# Patient Record
Sex: MALE | Race: WHITE
[De-identification: names, ages, dates, MRNs, and addresses within clinical notes are randomized per-mention and may not be internally consistent; named-entity substitution may affect disease eponyms.]

---

## 2017-12-13 ENCOUNTER — HOSPITAL ENCOUNTER (EMERGENCY)
Dept: HOSPITAL 94 - ER | Age: 32
Discharge: HOME | End: 2017-12-13
Payer: MEDICARE

## 2017-12-13 VITALS — SYSTOLIC BLOOD PRESSURE: 121 MMHG | DIASTOLIC BLOOD PRESSURE: 65 MMHG

## 2017-12-13 VITALS — WEIGHT: 176.37 LBS | HEIGHT: 66 IN | BODY MASS INDEX: 28.34 KG/M2
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
## 2017-12-13 DIAGNOSIS — G89.29: ICD-10-CM
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
## 2017-12-13 DIAGNOSIS — S80.02XA: Primary | ICD-10-CM
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
## 2017-12-13 DIAGNOSIS — M25.562: ICD-10-CM

## 2017-12-13 DIAGNOSIS — Z88.1: ICD-10-CM

## 2017-12-13 PROCEDURE — 29505 APPLICATION LONG LEG SPLINT: CPT

## 2017-12-13 PROCEDURE — 99284 EMERGENCY DEPT VISIT MOD MDM: CPT

## 2017-12-13 PROCEDURE — 73564 X-RAY EXAM KNEE 4 OR MORE: CPT

## 2018-04-05 ENCOUNTER — HOSPITAL ENCOUNTER (EMERGENCY)
Dept: HOSPITAL 94 - ER | Age: 33
LOS: 3 days | Discharge: HOME | End: 2018-04-08
Payer: MEDICARE

## 2018-04-05 VITALS — WEIGHT: 209.44 LBS | BODY MASS INDEX: 24.23 KG/M2 | HEIGHT: 78 IN

## 2018-04-05 DIAGNOSIS — Z56.0: ICD-10-CM

## 2018-04-05 DIAGNOSIS — Z88.1: ICD-10-CM

## 2018-04-05 DIAGNOSIS — F31.9: Primary | ICD-10-CM

## 2018-04-05 DIAGNOSIS — F41.9: ICD-10-CM

## 2018-04-05 DIAGNOSIS — G89.29: ICD-10-CM

## 2018-04-05 DIAGNOSIS — F23: ICD-10-CM

## 2018-04-05 DIAGNOSIS — Z79.899: ICD-10-CM

## 2018-04-05 DIAGNOSIS — N19: ICD-10-CM

## 2018-04-05 LAB
ALBUMIN SERPL BCP-MCNC: 4.4 G/DL (ref 3.4–5)
ALBUMIN/GLOB SERPL: 1.3 {RATIO} (ref 1.1–1.5)
ALP SERPL-CCNC: 68 IU/L (ref 46–116)
ALT SERPL W P-5'-P-CCNC: 34 U/L (ref 12–78)
ANION GAP SERPL CALCULATED.3IONS-SCNC: 17 MMOL/L (ref 8–16)
AST SERPL W P-5'-P-CCNC: 25 U/L (ref 10–37)
BASOPHILS # BLD AUTO: 0.1 X10'3 (ref 0–0.2)
BASOPHILS NFR BLD AUTO: 0.6 % (ref 0–1)
BILIRUB SERPL-MCNC: 0.6 MG/DL (ref 0.1–1)
BUN SERPL-MCNC: 23 MG/DL (ref 7–18)
BUN/CREAT SERPL: 16.7 (ref 5.4–32)
CALCIUM SERPL-MCNC: 9.5 MG/DL (ref 8.5–10.1)
CHLORIDE SERPL-SCNC: 106 MMOL/L (ref 99–107)
CLARITY UR: CLEAR
CO2 SERPL-SCNC: 23 MMOL/L (ref 24–32)
COLOR UR: YELLOW
CREAT SERPL-MCNC: 1.38 MG/DL (ref 0.6–1.1)
EOSINOPHIL # BLD AUTO: 0.1 X10'3 (ref 0–0.9)
EOSINOPHIL NFR BLD AUTO: 1.6 % (ref 0–6)
ERYTHROCYTE [DISTWIDTH] IN BLOOD BY AUTOMATED COUNT: 12.7 % (ref 11.5–14.5)
ETHANOL SERPL-MCNC: < 0.01 GM/DL (ref 0–0.01)
GFR SERPL CREATININE-BSD FRML MDRD: 59 ML/MIN
GLUCOSE SERPL-MCNC: 104 MG/DL (ref 70–104)
GLUCOSE UR STRIP-MCNC: NEGATIVE MG/DL
HCT VFR BLD AUTO: 49.5 % (ref 42–52)
HGB BLD-MCNC: 17 G/DL (ref 14–17.9)
HGB UR QL STRIP: (no result)
KETONES UR STRIP-MCNC: (no result) MG/DL
LEUKOCYTE ESTERASE UR QL STRIP: NEGATIVE
LYMPHOCYTES # BLD AUTO: 2.4 X10'3 (ref 1.1–4.8)
LYMPHOCYTES NFR BLD AUTO: 26.5 % (ref 21–51)
MCH RBC QN AUTO: 30.8 PG (ref 27–31)
MCHC RBC AUTO-ENTMCNC: 34.4 % (ref 33–36.5)
MCV RBC AUTO: 89.5 FL (ref 78–98)
MONOCYTES # BLD AUTO: 0.5 X10'3 (ref 0–0.9)
MONOCYTES NFR BLD AUTO: 5.6 % (ref 2–12)
NEUTROPHILS # BLD AUTO: 5.8 X10'3 (ref 1.8–7.7)
NEUTROPHILS NFR BLD AUTO: 65.7 % (ref 42–75)
NITRITE UR QL STRIP: NEGATIVE
PH UR STRIP: 7 [PH] (ref 4.8–8)
PLATELET # BLD AUTO: 408 X10'3 (ref 140–440)
PMV BLD AUTO: 6.1 FL (ref 7.4–10.4)
POTASSIUM SERPL-SCNC: 3.9 MMOL/L (ref 3.5–5.1)
PROT SERPL-MCNC: 7.8 G/DL (ref 6.4–8.2)
PROT UR QL STRIP: NEGATIVE MG/DL
RBC # BLD AUTO: 5.53 X10'6 (ref 4.7–6.1)
SODIUM SERPL-SCNC: 146 MMOL/L (ref 135–145)
SP GR UR STRIP: 1.01 (ref 1–1.03)
URN COLLECT METHOD CLASS: (no result)
UROBILINOGEN UR STRIP-MCNC: 0.2 E.U/DL (ref 0.2–1)
WBC # BLD AUTO: 8.9 X10'3 (ref 4.5–11)

## 2018-04-05 PROCEDURE — 36415 COLL VENOUS BLD VENIPUNCTURE: CPT

## 2018-04-05 PROCEDURE — 81001 URINALYSIS AUTO W/SCOPE: CPT

## 2018-04-05 PROCEDURE — 80320 DRUG SCREEN QUANTALCOHOLS: CPT

## 2018-04-05 PROCEDURE — 96374 THER/PROPH/DIAG INJ IV PUSH: CPT

## 2018-04-05 PROCEDURE — 85025 COMPLETE CBC W/AUTO DIFF WBC: CPT

## 2018-04-05 PROCEDURE — 80053 COMPREHEN METABOLIC PANEL: CPT

## 2018-04-05 PROCEDURE — 80305 DRUG TEST PRSMV DIR OPT OBS: CPT

## 2018-04-05 PROCEDURE — 96372 THER/PROPH/DIAG INJ SC/IM: CPT

## 2018-04-05 PROCEDURE — 96375 TX/PRO/DX INJ NEW DRUG ADDON: CPT

## 2018-04-05 PROCEDURE — 99284 EMERGENCY DEPT VISIT MOD MDM: CPT

## 2018-04-05 PROCEDURE — 84443 ASSAY THYROID STIM HORMONE: CPT

## 2018-04-05 PROCEDURE — 96361 HYDRATE IV INFUSION ADD-ON: CPT

## 2018-04-05 RX ADMIN — HALOPERIDOL LACTATE ONE MG: 5 INJECTION, SOLUTION INTRAMUSCULAR at 20:20

## 2018-04-05 RX ADMIN — HALOPERIDOL LACTATE ONE MG: 5 INJECTION, SOLUTION INTRAMUSCULAR at 20:23

## 2018-04-05 SDOH — ECONOMIC STABILITY - INCOME SECURITY: UNEMPLOYMENT, UNSPECIFIED: Z56.0

## 2018-04-06 LAB
AMPHETAMINES UR QL SCN: POSITIVE
BACTERIA URNS QL MICRO: (no result) /HPF
BARBITURATES UR QL SCN: NEGATIVE
BENZODIAZ UR QL SCN: NEGATIVE
BZE UR QL SCN: NEGATIVE
CANNABINOIDS UR QL SCN: NEGATIVE
DEPRECATED SQUAMOUS URNS QL MICRO: (no result) /LPF
METHADONE UR QL SCN: NEGATIVE
MUCOUS THREADS URNS QL MICRO: (no result) /LPF
OPIATES UR QL SCN: NEGATIVE
PCP UR QL SCN: NEGATIVE
RBC #/AREA URNS HPF: (no result) /HPF (ref 0–2)
WBC #/AREA URNS HPF: (no result) /HPF (ref 0–4)

## 2018-04-06 RX ADMIN — DEXTROAMPHETAMINE SACCHARATE, AMPHETAMINE ASPARTATE, DEXTROAMPHETAMINE SULFATE AND AMPHETAMINE SULFATE SCH MG: 2.5; 2.5; 2.5; 2.5 TABLET ORAL at 12:30

## 2018-04-06 RX ADMIN — VARENICLINE TARTRATE SCH MG: 0.5 TABLET, FILM COATED ORAL at 20:12

## 2018-04-06 RX ADMIN — VARENICLINE TARTRATE SCH MG: 0.5 TABLET, FILM COATED ORAL at 08:00

## 2018-04-06 RX ADMIN — DEXTROAMPHETAMINE SACCHARATE, AMPHETAMINE ASPARTATE, DEXTROAMPHETAMINE SULFATE AND AMPHETAMINE SULFATE SCH MG: 2.5; 2.5; 2.5; 2.5 TABLET ORAL at 07:30

## 2018-04-07 RX ADMIN — OLANZAPINE SCH MG: 5 TABLET, ORALLY DISINTEGRATING ORAL at 08:00

## 2018-04-07 RX ADMIN — VARENICLINE TARTRATE SCH MG: 0.5 TABLET, FILM COATED ORAL at 20:00

## 2018-04-07 RX ADMIN — OLANZAPINE SCH MG: 5 TABLET, ORALLY DISINTEGRATING ORAL at 14:30

## 2018-04-07 RX ADMIN — VARENICLINE TARTRATE SCH MG: 0.5 TABLET, FILM COATED ORAL at 08:00

## 2018-04-07 RX ADMIN — VARENICLINE TARTRATE SCH MG: 0.5 TABLET, FILM COATED ORAL at 14:25

## 2018-04-08 ENCOUNTER — HOSPITAL ENCOUNTER (INPATIENT)
Dept: HOSPITAL 94 - ADULT MH | Age: 33
LOS: 10 days | Discharge: HOME | DRG: 885 | End: 2018-04-18
Attending: PSYCHIATRY & NEUROLOGY | Admitting: PSYCHIATRY & NEUROLOGY
Payer: MEDICARE

## 2018-04-08 VITALS — HEIGHT: 66 IN | BODY MASS INDEX: 29.55 KG/M2 | WEIGHT: 183.87 LBS

## 2018-04-08 VITALS — DIASTOLIC BLOOD PRESSURE: 46 MMHG | SYSTOLIC BLOOD PRESSURE: 90 MMHG

## 2018-04-08 VITALS — DIASTOLIC BLOOD PRESSURE: 66 MMHG | SYSTOLIC BLOOD PRESSURE: 104 MMHG

## 2018-04-08 VITALS — SYSTOLIC BLOOD PRESSURE: 132 MMHG | DIASTOLIC BLOOD PRESSURE: 84 MMHG

## 2018-04-08 DIAGNOSIS — N28.9: ICD-10-CM

## 2018-04-08 DIAGNOSIS — Z79.899: ICD-10-CM

## 2018-04-08 DIAGNOSIS — F31.9: ICD-10-CM

## 2018-04-08 DIAGNOSIS — F41.9: ICD-10-CM

## 2018-04-08 DIAGNOSIS — F20.9: ICD-10-CM

## 2018-04-08 DIAGNOSIS — M54.9: ICD-10-CM

## 2018-04-08 DIAGNOSIS — Z78.1: ICD-10-CM

## 2018-04-08 DIAGNOSIS — G89.29: ICD-10-CM

## 2018-04-08 DIAGNOSIS — F15.10: ICD-10-CM

## 2018-04-08 DIAGNOSIS — Z87.891: ICD-10-CM

## 2018-04-08 DIAGNOSIS — E87.0: ICD-10-CM

## 2018-04-08 DIAGNOSIS — F29: Primary | ICD-10-CM

## 2018-04-08 PROCEDURE — 99285 EMERGENCY DEPT VISIT HI MDM: CPT

## 2018-04-08 PROCEDURE — 36415 COLL VENOUS BLD VENIPUNCTURE: CPT

## 2018-04-08 PROCEDURE — 80061 LIPID PANEL: CPT

## 2018-04-08 PROCEDURE — 87070 CULTURE OTHR SPECIMN AEROBIC: CPT

## 2018-04-08 PROCEDURE — 83036 HEMOGLOBIN GLYCOSYLATED A1C: CPT

## 2018-04-08 PROCEDURE — 80053 COMPREHEN METABOLIC PANEL: CPT

## 2018-04-08 PROCEDURE — 71110 X-RAY EXAM RIBS BIL 3 VIEWS: CPT

## 2018-04-08 RX ADMIN — VARENICLINE TARTRATE SCH MG: 0.5 TABLET, FILM COATED ORAL at 19:21

## 2018-04-08 RX ADMIN — VARENICLINE TARTRATE SCH MG: 0.5 TABLET, FILM COATED ORAL at 09:51

## 2018-04-09 VITALS — SYSTOLIC BLOOD PRESSURE: 92 MMHG | DIASTOLIC BLOOD PRESSURE: 45 MMHG

## 2018-04-09 VITALS — SYSTOLIC BLOOD PRESSURE: 107 MMHG | DIASTOLIC BLOOD PRESSURE: 67 MMHG

## 2018-04-09 LAB
ALBUMIN SERPL BCP-MCNC: 3.4 G/DL (ref 3.4–5)
ALBUMIN/GLOB SERPL: 1.1 {RATIO} (ref 1.1–1.5)
ALP SERPL-CCNC: 60 IU/L (ref 46–116)
ALT SERPL W P-5'-P-CCNC: 37 U/L (ref 12–78)
ANION GAP SERPL CALCULATED.3IONS-SCNC: 10 MMOL/L (ref 8–16)
AST SERPL W P-5'-P-CCNC: 24 U/L (ref 10–37)
BILIRUB SERPL-MCNC: 0.3 MG/DL (ref 0.1–1)
BUN SERPL-MCNC: 17 MG/DL (ref 7–18)
BUN/CREAT SERPL: 16.8 (ref 5.4–32)
CALCIUM SERPL-MCNC: 8.7 MG/DL (ref 8.5–10.1)
CHLORIDE SERPL-SCNC: 107 MMOL/L (ref 99–107)
CHOLEST SERPL-MCNC: 212 MG/DL (ref 0–200)
CHOLEST/HDLC SERPL: 5.7 {RATIO} (ref 0–4.99)
CO2 SERPL-SCNC: 27.2 MMOL/L (ref 24–32)
CREAT SERPL-MCNC: 1.01 MG/DL (ref 0.6–1.1)
GFR SERPL CREATININE-BSD FRML MDRD: 85 ML/MIN
GLUCOSE SERPL-MCNC: 85 MG/DL (ref 70–104)
HBA1C MFR BLD: 5.7 % (ref 4.5–6.2)
HDLC SERPL-MCNC: 37 MG/DL (ref 35–60)
LDLC SERPL DIRECT ASSAY-MCNC: 150 MG/DL (ref 50–100)
POTASSIUM SERPL-SCNC: 4.2 MMOL/L (ref 3.5–5.1)
PROT SERPL-MCNC: 6.6 G/DL (ref 6.4–8.2)
SODIUM SERPL-SCNC: 144 MMOL/L (ref 135–145)
TRIGL SERPL-MCNC: 110 MG/DL (ref 20–135)

## 2018-04-09 RX ADMIN — VARENICLINE TARTRATE SCH MG: 0.5 TABLET, FILM COATED ORAL at 07:27

## 2018-04-09 RX ADMIN — VARENICLINE TARTRATE SCH MG: 0.5 TABLET, FILM COATED ORAL at 21:23

## 2018-04-10 VITALS — SYSTOLIC BLOOD PRESSURE: 107 MMHG | DIASTOLIC BLOOD PRESSURE: 66 MMHG

## 2018-04-10 VITALS — SYSTOLIC BLOOD PRESSURE: 108 MMHG | DIASTOLIC BLOOD PRESSURE: 61 MMHG

## 2018-04-10 RX ADMIN — VARENICLINE TARTRATE SCH MG: 0.5 TABLET, FILM COATED ORAL at 20:35

## 2018-04-10 RX ADMIN — VARENICLINE TARTRATE SCH MG: 0.5 TABLET, FILM COATED ORAL at 08:30

## 2018-04-11 VITALS — SYSTOLIC BLOOD PRESSURE: 107 MMHG | DIASTOLIC BLOOD PRESSURE: 76 MMHG

## 2018-04-11 VITALS — SYSTOLIC BLOOD PRESSURE: 125 MMHG | DIASTOLIC BLOOD PRESSURE: 79 MMHG

## 2018-04-11 RX ADMIN — IBUPROFEN PRN MG: 400 TABLET, FILM COATED ORAL at 12:29

## 2018-04-11 RX ADMIN — VARENICLINE TARTRATE SCH MG: 0.5 TABLET, FILM COATED ORAL at 20:27

## 2018-04-11 RX ADMIN — VARENICLINE TARTRATE SCH MG: 0.5 TABLET, FILM COATED ORAL at 07:42

## 2018-04-12 VITALS — SYSTOLIC BLOOD PRESSURE: 125 MMHG | DIASTOLIC BLOOD PRESSURE: 91 MMHG

## 2018-04-12 VITALS — DIASTOLIC BLOOD PRESSURE: 52 MMHG | SYSTOLIC BLOOD PRESSURE: 94 MMHG

## 2018-04-12 RX ADMIN — VARENICLINE TARTRATE SCH MG: 0.5 TABLET, FILM COATED ORAL at 07:38

## 2018-04-13 VITALS — DIASTOLIC BLOOD PRESSURE: 72 MMHG | SYSTOLIC BLOOD PRESSURE: 119 MMHG

## 2018-04-13 VITALS — DIASTOLIC BLOOD PRESSURE: 70 MMHG | SYSTOLIC BLOOD PRESSURE: 115 MMHG

## 2018-04-13 VITALS — DIASTOLIC BLOOD PRESSURE: 50 MMHG | SYSTOLIC BLOOD PRESSURE: 94 MMHG

## 2018-04-13 RX ADMIN — HALOPERIDOL LACTATE PRN MG: 5 INJECTION, SOLUTION INTRAMUSCULAR at 17:56

## 2018-04-13 RX ADMIN — OLANZAPINE SCH MG: 5 TABLET, ORALLY DISINTEGRATING ORAL at 08:09

## 2018-04-13 RX ADMIN — OLANZAPINE SCH MG: 5 TABLET, ORALLY DISINTEGRATING ORAL at 11:37

## 2018-04-13 RX ADMIN — IBUPROFEN PRN MG: 400 TABLET, FILM COATED ORAL at 16:30

## 2018-04-14 VITALS — SYSTOLIC BLOOD PRESSURE: 126 MMHG | DIASTOLIC BLOOD PRESSURE: 79 MMHG

## 2018-04-14 VITALS — DIASTOLIC BLOOD PRESSURE: 72 MMHG | SYSTOLIC BLOOD PRESSURE: 112 MMHG

## 2018-04-14 VITALS — SYSTOLIC BLOOD PRESSURE: 136 MMHG | DIASTOLIC BLOOD PRESSURE: 72 MMHG

## 2018-04-14 RX ADMIN — IBUPROFEN PRN MG: 400 TABLET, FILM COATED ORAL at 08:21

## 2018-04-14 RX ADMIN — OLANZAPINE SCH MG: 5 TABLET, ORALLY DISINTEGRATING ORAL at 20:09

## 2018-04-14 RX ADMIN — HALOPERIDOL LACTATE PRN MG: 5 INJECTION, SOLUTION INTRAMUSCULAR at 15:39

## 2018-04-15 VITALS — DIASTOLIC BLOOD PRESSURE: 72 MMHG | SYSTOLIC BLOOD PRESSURE: 116 MMHG

## 2018-04-15 VITALS — DIASTOLIC BLOOD PRESSURE: 73 MMHG | SYSTOLIC BLOOD PRESSURE: 119 MMHG

## 2018-04-15 RX ADMIN — OLANZAPINE SCH MG: 5 TABLET, ORALLY DISINTEGRATING ORAL at 20:48

## 2018-04-16 VITALS — SYSTOLIC BLOOD PRESSURE: 135 MMHG | DIASTOLIC BLOOD PRESSURE: 83 MMHG

## 2018-04-16 VITALS — SYSTOLIC BLOOD PRESSURE: 105 MMHG | DIASTOLIC BLOOD PRESSURE: 65 MMHG

## 2018-04-16 RX ADMIN — IBUPROFEN PRN MG: 400 TABLET, FILM COATED ORAL at 20:20

## 2018-04-16 RX ADMIN — OLANZAPINE SCH MG: 5 TABLET, ORALLY DISINTEGRATING ORAL at 20:21

## 2018-04-17 VITALS — DIASTOLIC BLOOD PRESSURE: 62 MMHG | SYSTOLIC BLOOD PRESSURE: 122 MMHG

## 2018-04-17 VITALS — SYSTOLIC BLOOD PRESSURE: 93 MMHG | DIASTOLIC BLOOD PRESSURE: 50 MMHG

## 2018-04-17 VITALS — SYSTOLIC BLOOD PRESSURE: 133 MMHG | DIASTOLIC BLOOD PRESSURE: 81 MMHG

## 2018-04-17 RX ADMIN — OLANZAPINE SCH MG: 5 TABLET, ORALLY DISINTEGRATING ORAL at 21:00

## 2018-04-18 VITALS — DIASTOLIC BLOOD PRESSURE: 87 MMHG | SYSTOLIC BLOOD PRESSURE: 133 MMHG

## 2019-07-12 ENCOUNTER — HOSPITAL ENCOUNTER (EMERGENCY)
Dept: HOSPITAL 94 - ER | Age: 34
Discharge: HOME | End: 2019-07-12
Payer: MEDICARE

## 2019-07-12 VITALS — SYSTOLIC BLOOD PRESSURE: 118 MMHG | DIASTOLIC BLOOD PRESSURE: 87 MMHG

## 2019-07-12 VITALS — HEIGHT: 66 IN | WEIGHT: 182.39 LBS | BODY MASS INDEX: 29.31 KG/M2

## 2019-07-12 DIAGNOSIS — Z88.1: ICD-10-CM

## 2019-07-12 DIAGNOSIS — G89.29: ICD-10-CM

## 2019-07-12 DIAGNOSIS — F20.9: ICD-10-CM

## 2019-07-12 DIAGNOSIS — R07.89: ICD-10-CM

## 2019-07-12 DIAGNOSIS — Z79.899: ICD-10-CM

## 2019-07-12 DIAGNOSIS — F41.9: Primary | ICD-10-CM

## 2019-07-12 DIAGNOSIS — Z59.0: ICD-10-CM

## 2019-07-12 DIAGNOSIS — F31.9: ICD-10-CM

## 2019-07-12 DIAGNOSIS — F15.10: ICD-10-CM

## 2019-07-12 DIAGNOSIS — Z56.0: ICD-10-CM

## 2019-07-12 LAB
ALBUMIN SERPL BCP-MCNC: 3.6 G/DL (ref 3.4–5)
ALBUMIN/GLOB SERPL: 1.1 {RATIO} (ref 1.1–1.5)
ALP SERPL-CCNC: 97 IU/L (ref 46–116)
ALT SERPL W P-5'-P-CCNC: 60 U/L (ref 12–78)
ANION GAP SERPL CALCULATED.3IONS-SCNC: 9 MMOL/L (ref 8–16)
APTT PPP: 27 SECONDS (ref 22–32)
AST SERPL W P-5'-P-CCNC: 30 U/L (ref 10–37)
BASOPHILS # BLD AUTO: 0 X10'3 (ref 0–0.2)
BASOPHILS NFR BLD AUTO: 0.5 % (ref 0–1)
BILIRUB SERPL-MCNC: 0.2 MG/DL (ref 0.1–1)
BUN SERPL-MCNC: 13 MG/DL (ref 7–18)
BUN/CREAT SERPL: 12.6 (ref 5.4–32)
CALCIUM SERPL-MCNC: 9 MG/DL (ref 8.5–10.1)
CHLORIDE SERPL-SCNC: 103 MMOL/L (ref 99–107)
CO2 SERPL-SCNC: 26.2 MMOL/L (ref 24–32)
CREAT SERPL-MCNC: 1.03 MG/DL (ref 0.6–1.1)
EOSINOPHIL # BLD AUTO: 0.2 X10'3 (ref 0–0.9)
EOSINOPHIL NFR BLD AUTO: 2.1 % (ref 0–6)
ERYTHROCYTE [DISTWIDTH] IN BLOOD BY AUTOMATED COUNT: 13.5 % (ref 11.5–14.5)
GFR SERPL CREATININE-BSD FRML MDRD: 82 ML/MIN
GLUCOSE SERPL-MCNC: 99 MG/DL (ref 70–104)
HCT VFR BLD AUTO: 42.7 % (ref 42–52)
HGB BLD-MCNC: 15 G/DL (ref 14–17.9)
LYMPHOCYTES # BLD AUTO: 2 X10'3 (ref 1.1–4.8)
LYMPHOCYTES NFR BLD AUTO: 26.9 % (ref 21–51)
MCH RBC QN AUTO: 30.9 PG (ref 27–31)
MCHC RBC AUTO-ENTMCNC: 35 G/DL (ref 33–36.5)
MCV RBC AUTO: 88.2 FL (ref 78–98)
MONOCYTES # BLD AUTO: 0.4 X10'3 (ref 0–0.9)
MONOCYTES NFR BLD AUTO: 5.3 % (ref 2–12)
NEUTROPHILS # BLD AUTO: 4.9 X10'3 (ref 1.8–7.7)
NEUTROPHILS NFR BLD AUTO: 65.2 % (ref 42–75)
PLATELET # BLD AUTO: 400 X10'3 (ref 140–440)
PMV BLD AUTO: 6.2 FL (ref 7.4–10.4)
POTASSIUM SERPL-SCNC: 3.8 MMOL/L (ref 3.5–5.1)
PROT SERPL-MCNC: 6.8 G/DL (ref 6.4–8.2)
RBC # BLD AUTO: 4.85 X10'6 (ref 4.7–6.1)
SODIUM SERPL-SCNC: 138 MMOL/L (ref 135–145)
WBC # BLD AUTO: 7.5 X10'3 (ref 4.5–11)

## 2019-07-12 PROCEDURE — 85025 COMPLETE CBC W/AUTO DIFF WBC: CPT

## 2019-07-12 PROCEDURE — 85610 PROTHROMBIN TIME: CPT

## 2019-07-12 PROCEDURE — 80053 COMPREHEN METABOLIC PANEL: CPT

## 2019-07-12 PROCEDURE — 85730 THROMBOPLASTIN TIME PARTIAL: CPT

## 2019-07-12 PROCEDURE — 93005 ELECTROCARDIOGRAM TRACING: CPT

## 2019-07-12 PROCEDURE — 71045 X-RAY EXAM CHEST 1 VIEW: CPT

## 2019-07-12 PROCEDURE — 99284 EMERGENCY DEPT VISIT MOD MDM: CPT

## 2019-07-12 PROCEDURE — 84484 ASSAY OF TROPONIN QUANT: CPT

## 2019-07-12 PROCEDURE — 36415 COLL VENOUS BLD VENIPUNCTURE: CPT

## 2019-07-12 SDOH — ECONOMIC STABILITY - HOUSING INSECURITY: HOMELESSNESS: Z59.0

## 2019-07-12 SDOH — ECONOMIC STABILITY - INCOME SECURITY: UNEMPLOYMENT, UNSPECIFIED: Z56.0

## 2019-07-12 NOTE — NUR
pt c/o chest pain, "I have anxiety...lots of stress in my life...just started 
to see an therapist because I have anxiety", pt denies recent drug/ETOH use, 
waiting to be evaluated by provider,  at bedside to eval pt

## 2019-07-14 ENCOUNTER — HOSPITAL ENCOUNTER (EMERGENCY)
Dept: HOSPITAL 94 - ER | Age: 34
Discharge: HOME | End: 2019-07-14
Payer: MEDICARE

## 2019-07-14 VITALS — DIASTOLIC BLOOD PRESSURE: 52 MMHG | SYSTOLIC BLOOD PRESSURE: 122 MMHG

## 2019-07-14 VITALS — BODY MASS INDEX: 30.6 KG/M2 | HEIGHT: 66 IN | WEIGHT: 190.39 LBS

## 2019-07-14 DIAGNOSIS — F41.9: ICD-10-CM

## 2019-07-14 DIAGNOSIS — Z79.899: ICD-10-CM

## 2019-07-14 DIAGNOSIS — Z88.1: ICD-10-CM

## 2019-07-14 DIAGNOSIS — Z59.0: ICD-10-CM

## 2019-07-14 DIAGNOSIS — G89.29: ICD-10-CM

## 2019-07-14 DIAGNOSIS — F17.200: ICD-10-CM

## 2019-07-14 DIAGNOSIS — F15.90: ICD-10-CM

## 2019-07-14 DIAGNOSIS — Z56.0: ICD-10-CM

## 2019-07-14 DIAGNOSIS — F31.9: ICD-10-CM

## 2019-07-14 DIAGNOSIS — F20.9: ICD-10-CM

## 2019-07-14 DIAGNOSIS — R07.89: Primary | ICD-10-CM

## 2019-07-14 PROCEDURE — 99283 EMERGENCY DEPT VISIT LOW MDM: CPT

## 2019-07-14 PROCEDURE — 93005 ELECTROCARDIOGRAM TRACING: CPT

## 2019-07-14 PROCEDURE — 71046 X-RAY EXAM CHEST 2 VIEWS: CPT

## 2019-07-14 SDOH — ECONOMIC STABILITY - INCOME SECURITY: UNEMPLOYMENT, UNSPECIFIED: Z56.0

## 2019-07-14 SDOH — ECONOMIC STABILITY - HOUSING INSECURITY: HOMELESSNESS: Z59.0

## 2019-07-14 NOTE — NUR
Attempted to DC patient on 3 occasions and patient was insistant that he could 
not leave with out being given valium. MD informed and security contacted to 
assist in discharging patient. MD spoke with patient and pt was discharged with 
security standing by.

## 2019-09-28 ENCOUNTER — HOSPITAL ENCOUNTER (EMERGENCY)
Dept: HOSPITAL 94 - ER | Age: 34
Discharge: HOME | End: 2019-09-28
Payer: COMMERCIAL

## 2019-09-28 VITALS — BODY MASS INDEX: 24.8 KG/M2 | HEIGHT: 66 IN | WEIGHT: 154.32 LBS

## 2019-09-28 VITALS — DIASTOLIC BLOOD PRESSURE: 80 MMHG | SYSTOLIC BLOOD PRESSURE: 130 MMHG
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
## 2019-09-28 DIAGNOSIS — S61.217A: ICD-10-CM
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
## 2019-09-28 DIAGNOSIS — G89.29: ICD-10-CM
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
## 2019-09-28 DIAGNOSIS — Z88.1: ICD-10-CM

## 2019-09-28 DIAGNOSIS — Z59.0: ICD-10-CM

## 2019-09-28 DIAGNOSIS — Z79.899: ICD-10-CM

## 2019-09-28 DIAGNOSIS — F15.90: ICD-10-CM

## 2019-09-28 DIAGNOSIS — S63.592A: Primary | ICD-10-CM

## 2019-09-28 DIAGNOSIS — F20.9: ICD-10-CM

## 2019-09-28 DIAGNOSIS — F41.9: ICD-10-CM

## 2019-09-28 DIAGNOSIS — F31.9: ICD-10-CM

## 2019-09-28 DIAGNOSIS — Z56.0: ICD-10-CM

## 2019-09-28 DIAGNOSIS — Z79.2: ICD-10-CM

## 2019-09-28 LAB
ALBUMIN SERPL BCP-MCNC: 3.8 G/DL (ref 3.4–5)
ALBUMIN/GLOB SERPL: 1.3 {RATIO} (ref 1.1–1.5)
ALP SERPL-CCNC: 65 IU/L (ref 46–116)
ALT SERPL W P-5'-P-CCNC: 34 U/L (ref 12–78)
ANION GAP SERPL CALCULATED.3IONS-SCNC: 8 MMOL/L (ref 8–16)
AST SERPL W P-5'-P-CCNC: 20 U/L (ref 10–37)
BASOPHILS # BLD AUTO: 0 X10'3 (ref 0–0.2)
BASOPHILS NFR BLD AUTO: 0.7 % (ref 0–1)
BILIRUB SERPL-MCNC: 0.4 MG/DL (ref 0.1–1)
BUN SERPL-MCNC: 16 MG/DL (ref 7–18)
BUN/CREAT SERPL: 15.5 (ref 5.4–32)
CALCIUM SERPL-MCNC: 9.3 MG/DL (ref 8.5–10.1)
CHLORIDE SERPL-SCNC: 107 MMOL/L (ref 99–107)
CO2 SERPL-SCNC: 27.1 MMOL/L (ref 24–32)
CREAT SERPL-MCNC: 1.03 MG/DL (ref 0.6–1.1)
EOSINOPHIL # BLD AUTO: 0.2 X10'3 (ref 0–0.9)
EOSINOPHIL NFR BLD AUTO: 3.6 % (ref 0–6)
ERYTHROCYTE [DISTWIDTH] IN BLOOD BY AUTOMATED COUNT: 12.8 % (ref 11.5–14.5)
GFR SERPL CREATININE-BSD FRML MDRD: 82 ML/MIN
GLUCOSE SERPL-MCNC: 104 MG/DL (ref 70–104)
HCT VFR BLD AUTO: 44.9 % (ref 42–52)
HGB BLD-MCNC: 15.8 G/DL (ref 14–17.9)
LYMPHOCYTES # BLD AUTO: 1.2 X10'3 (ref 1.1–4.8)
LYMPHOCYTES NFR BLD AUTO: 18.3 % (ref 21–51)
MCH RBC QN AUTO: 31 PG (ref 27–31)
MCHC RBC AUTO-ENTMCNC: 35.1 G/DL (ref 33–36.5)
MCV RBC AUTO: 88.2 FL (ref 78–98)
MONOCYTES # BLD AUTO: 0.3 X10'3 (ref 0–0.9)
MONOCYTES NFR BLD AUTO: 5.2 % (ref 2–12)
NEUTROPHILS # BLD AUTO: 4.8 X10'3 (ref 1.8–7.7)
NEUTROPHILS NFR BLD AUTO: 72.2 % (ref 42–75)
PLATELET # BLD AUTO: 400 X10'3 (ref 140–440)
PMV BLD AUTO: 6 FL (ref 7.4–10.4)
POTASSIUM SERPL-SCNC: 4.3 MMOL/L (ref 3.5–5.1)
PROT SERPL-MCNC: 6.8 G/DL (ref 6.4–8.2)
RBC # BLD AUTO: 5.09 X10'6 (ref 4.7–6.1)
SODIUM SERPL-SCNC: 142 MMOL/L (ref 135–145)
WBC # BLD AUTO: 6.6 X10'3 (ref 4.5–11)

## 2019-09-28 PROCEDURE — 99284 EMERGENCY DEPT VISIT MOD MDM: CPT

## 2019-09-28 PROCEDURE — 29125 APPL SHORT ARM SPLINT STATIC: CPT

## 2019-09-28 PROCEDURE — 84145 PROCALCITONIN (PCT): CPT

## 2019-09-28 PROCEDURE — 73130 X-RAY EXAM OF HAND: CPT

## 2019-09-28 PROCEDURE — 96365 THER/PROPH/DIAG IV INF INIT: CPT

## 2019-09-28 PROCEDURE — 96375 TX/PRO/DX INJ NEW DRUG ADDON: CPT

## 2019-09-28 PROCEDURE — 36415 COLL VENOUS BLD VENIPUNCTURE: CPT

## 2019-09-28 PROCEDURE — 85025 COMPLETE CBC W/AUTO DIFF WBC: CPT

## 2019-09-28 PROCEDURE — 73110 X-RAY EXAM OF WRIST: CPT

## 2019-09-28 PROCEDURE — 80053 COMPREHEN METABOLIC PANEL: CPT

## 2019-09-28 PROCEDURE — 96368 THER/DIAG CONCURRENT INF: CPT

## 2019-09-28 SDOH — ECONOMIC STABILITY - HOUSING INSECURITY: HOMELESSNESS: Z59.0

## 2019-09-28 SDOH — ECONOMIC STABILITY - INCOME SECURITY: UNEMPLOYMENT, UNSPECIFIED: Z56.0

## 2019-10-01 ENCOUNTER — HOSPITAL ENCOUNTER (EMERGENCY)
Dept: HOSPITAL 94 - ER | Age: 34
Discharge: HOME | End: 2019-10-01
Payer: COMMERCIAL

## 2019-10-01 VITALS — WEIGHT: 168.21 LBS | HEIGHT: 66 IN | BODY MASS INDEX: 27.03 KG/M2

## 2019-10-01 VITALS — SYSTOLIC BLOOD PRESSURE: 138 MMHG | DIASTOLIC BLOOD PRESSURE: 57 MMHG

## 2019-10-01 DIAGNOSIS — F15.90: ICD-10-CM

## 2019-10-01 DIAGNOSIS — F20.9: ICD-10-CM

## 2019-10-01 DIAGNOSIS — F31.9: ICD-10-CM

## 2019-10-01 DIAGNOSIS — F17.200: ICD-10-CM

## 2019-10-01 DIAGNOSIS — Z79.899: ICD-10-CM

## 2019-10-01 DIAGNOSIS — Z88.1: ICD-10-CM

## 2019-10-01 DIAGNOSIS — R07.89: Primary | ICD-10-CM

## 2019-10-01 DIAGNOSIS — G89.29: ICD-10-CM

## 2019-10-01 DIAGNOSIS — F41.9: ICD-10-CM

## 2019-10-01 DIAGNOSIS — Z56.0: ICD-10-CM

## 2019-10-01 DIAGNOSIS — M79.602: ICD-10-CM

## 2019-10-01 DIAGNOSIS — Z79.2: ICD-10-CM

## 2019-10-01 DIAGNOSIS — Z59.0: ICD-10-CM

## 2019-10-01 PROCEDURE — 93005 ELECTROCARDIOGRAM TRACING: CPT

## 2019-10-01 PROCEDURE — 99283 EMERGENCY DEPT VISIT LOW MDM: CPT

## 2019-10-01 PROCEDURE — 71045 X-RAY EXAM CHEST 1 VIEW: CPT

## 2019-10-01 PROCEDURE — 29125 APPL SHORT ARM SPLINT STATIC: CPT

## 2019-10-01 PROCEDURE — 96372 THER/PROPH/DIAG INJ SC/IM: CPT

## 2019-10-01 SDOH — ECONOMIC STABILITY - HOUSING INSECURITY: HOMELESSNESS: Z59.0

## 2019-10-01 SDOH — ECONOMIC STABILITY - INCOME SECURITY: UNEMPLOYMENT, UNSPECIFIED: Z56.0

## 2019-10-01 NOTE — NUR
pt left arm splint and sling removed as ordered . pt rates pain 3-4 out of 10 
at thei time . Dr Joel aware sling and splint have been removed. pt updated 
poc awaitchu child at this time

## 2019-10-01 NOTE — NUR
pts sling and splint reapplied by ER tech.  pt ambulatory to restroom without 
assist/difficulty.  Pt currently awaiting transport home via his wife.

## 2019-10-02 ENCOUNTER — HOSPITAL ENCOUNTER (EMERGENCY)
Dept: HOSPITAL 94 - ER | Age: 34
Discharge: HOME | End: 2019-10-02
Payer: MEDICARE

## 2019-10-02 VITALS — SYSTOLIC BLOOD PRESSURE: 136 MMHG | DIASTOLIC BLOOD PRESSURE: 72 MMHG

## 2019-10-02 VITALS — BODY MASS INDEX: 26.25 KG/M2 | HEIGHT: 66 IN | WEIGHT: 163.34 LBS

## 2019-10-02 DIAGNOSIS — M79.602: ICD-10-CM

## 2019-10-02 DIAGNOSIS — Z88.1: ICD-10-CM

## 2019-10-02 DIAGNOSIS — Z79.899: ICD-10-CM

## 2019-10-02 DIAGNOSIS — F15.90: ICD-10-CM

## 2019-10-02 DIAGNOSIS — Z59.0: ICD-10-CM

## 2019-10-02 DIAGNOSIS — Z56.0: ICD-10-CM

## 2019-10-02 DIAGNOSIS — M25.532: Primary | ICD-10-CM

## 2019-10-02 DIAGNOSIS — G89.29: ICD-10-CM

## 2019-10-02 PROCEDURE — 99283 EMERGENCY DEPT VISIT LOW MDM: CPT

## 2019-10-02 PROCEDURE — 29125 APPL SHORT ARM SPLINT STATIC: CPT

## 2019-10-02 SDOH — ECONOMIC STABILITY - HOUSING INSECURITY: HOMELESSNESS: Z59.0

## 2019-10-02 SDOH — ECONOMIC STABILITY - INCOME SECURITY: UNEMPLOYMENT, UNSPECIFIED: Z56.0

## 2019-10-04 ENCOUNTER — HOSPITAL ENCOUNTER (EMERGENCY)
Dept: HOSPITAL 94 - ER | Age: 34
Discharge: HOME | End: 2019-10-04
Payer: COMMERCIAL

## 2019-10-04 VITALS — SYSTOLIC BLOOD PRESSURE: 126 MMHG | DIASTOLIC BLOOD PRESSURE: 65 MMHG

## 2019-10-04 VITALS — HEIGHT: 66 IN | BODY MASS INDEX: 28.7 KG/M2 | WEIGHT: 178.57 LBS

## 2019-10-04 DIAGNOSIS — F41.9: ICD-10-CM

## 2019-10-04 DIAGNOSIS — F31.9: ICD-10-CM

## 2019-10-04 DIAGNOSIS — F20.9: ICD-10-CM

## 2019-10-04 DIAGNOSIS — Z59.0: ICD-10-CM

## 2019-10-04 DIAGNOSIS — Z56.0: ICD-10-CM

## 2019-10-04 DIAGNOSIS — Z79.899: ICD-10-CM

## 2019-10-04 DIAGNOSIS — Z00.00: Primary | ICD-10-CM

## 2019-10-04 DIAGNOSIS — Z79.2: ICD-10-CM

## 2019-10-04 DIAGNOSIS — G89.29: ICD-10-CM

## 2019-10-04 DIAGNOSIS — Z88.1: ICD-10-CM

## 2019-10-04 DIAGNOSIS — F15.90: ICD-10-CM

## 2019-10-04 PROCEDURE — 99283 EMERGENCY DEPT VISIT LOW MDM: CPT

## 2019-10-04 SDOH — ECONOMIC STABILITY - INCOME SECURITY: UNEMPLOYMENT, UNSPECIFIED: Z56.0

## 2019-10-04 SDOH — ECONOMIC STABILITY - HOUSING INSECURITY: HOMELESSNESS: Z59.0

## 2019-10-04 NOTE — NUR
PATIENT HERE FOR 1. A DOCTOR'S NOTE: FOR HIS JOB AT [employer] 
WHERE HE HAS WORKED FOR THREE MONTHS AND IS CLAIMING WORKERS COMP FOR THIS 
INJURY

2.  ZOFRAN REFILL: HE WAS PRESCRIBED ZOFRAN 4 MG #15 ON 9/28/19 AND STATES THAT 
"HIS PRESCIPTION DISAPPEARED, MAYBE MY WIFE THREW IT OUT BY ACCIDENT" PATIENT 
DID PICK IT UP FROM [facility] AS NOTED BY THE LIST HE BROUGHT FROM [facility].



PATIENT FIRST HERE FOR THIS CONDITION ON 9/28/19 AND THEN SEEN ON 10/1 AND 
10/2.

DANYELLN PRESENTS IN A SLING AND WHAT APPEARS TO BE AN ULNAR GUTTER SPINT OR 
CAST IN A SLING: CRT WNL, COLOR PINK, AND HE CAN WIGGLE AND FEEL ALL FINGERS: 
HE CAN NOT MOVE HIS PINKIS FINGER BUT CAN FEEL IT

## 2019-10-07 ENCOUNTER — HOSPITAL ENCOUNTER (EMERGENCY)
Dept: HOSPITAL 94 - ER | Age: 34
Discharge: HOME | End: 2019-10-07
Payer: COMMERCIAL

## 2019-10-07 VITALS — WEIGHT: 160.34 LBS | HEIGHT: 66 IN | BODY MASS INDEX: 25.77 KG/M2

## 2019-10-07 VITALS — SYSTOLIC BLOOD PRESSURE: 136 MMHG | DIASTOLIC BLOOD PRESSURE: 92 MMHG

## 2019-10-07 DIAGNOSIS — Z56.0: ICD-10-CM

## 2019-10-07 DIAGNOSIS — F31.9: ICD-10-CM

## 2019-10-07 DIAGNOSIS — F15.90: ICD-10-CM

## 2019-10-07 DIAGNOSIS — G89.29: ICD-10-CM

## 2019-10-07 DIAGNOSIS — Z88.1: ICD-10-CM

## 2019-10-07 DIAGNOSIS — Z79.899: ICD-10-CM

## 2019-10-07 DIAGNOSIS — Z59.0: ICD-10-CM

## 2019-10-07 DIAGNOSIS — F41.9: Primary | ICD-10-CM

## 2019-10-07 DIAGNOSIS — F20.9: ICD-10-CM

## 2019-10-07 PROCEDURE — 99284 EMERGENCY DEPT VISIT MOD MDM: CPT

## 2019-10-07 SDOH — ECONOMIC STABILITY - HOUSING INSECURITY: HOMELESSNESS: Z59.0

## 2019-10-07 SDOH — ECONOMIC STABILITY - INCOME SECURITY: UNEMPLOYMENT, UNSPECIFIED: Z56.0

## 2019-10-07 NOTE — NUR
pt co anxiety due to traumatizing event at home as per pt he saw" my wife 
sleeping with some one else and my dad cornea came out ".

## 2019-10-15 ENCOUNTER — HOSPITAL ENCOUNTER (EMERGENCY)
Dept: HOSPITAL 94 - ER | Age: 34
Discharge: HOME | End: 2019-10-15
Payer: MEDICARE

## 2019-10-15 VITALS — BODY MASS INDEX: 29.16 KG/M2 | HEIGHT: 66 IN | WEIGHT: 181.44 LBS

## 2019-10-15 VITALS — DIASTOLIC BLOOD PRESSURE: 97 MMHG | SYSTOLIC BLOOD PRESSURE: 143 MMHG

## 2019-10-15 DIAGNOSIS — S66.303A: Primary | ICD-10-CM

## 2019-10-15 DIAGNOSIS — Z56.0: ICD-10-CM
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
## 2019-10-15 DIAGNOSIS — Z88.1: ICD-10-CM

## 2019-10-15 DIAGNOSIS — G89.29: ICD-10-CM

## 2019-10-15 DIAGNOSIS — F15.90: ICD-10-CM

## 2019-10-15 DIAGNOSIS — F31.9: ICD-10-CM
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
## 2019-10-15 DIAGNOSIS — Z59.0: ICD-10-CM

## 2019-10-15 DIAGNOSIS — Z79.899: ICD-10-CM

## 2019-10-15 DIAGNOSIS — F20.9: ICD-10-CM
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
## 2019-10-15 DIAGNOSIS — F41.9: ICD-10-CM

## 2019-10-15 LAB
ALBUMIN SERPL BCP-MCNC: 4 G/DL (ref 3.4–5)
ALBUMIN/GLOB SERPL: 1.2 {RATIO} (ref 1.1–1.5)
ALP SERPL-CCNC: 64 IU/L (ref 46–116)
ALT SERPL W P-5'-P-CCNC: 44 U/L (ref 12–78)
ANION GAP SERPL CALCULATED.3IONS-SCNC: 11 MMOL/L (ref 8–16)
APTT PPP: 26 SECONDS (ref 22–32)
AST SERPL W P-5'-P-CCNC: 25 U/L (ref 10–37)
BASOPHILS # BLD AUTO: 0.1 X10'3 (ref 0–0.2)
BASOPHILS NFR BLD AUTO: 1.1 % (ref 0–1)
BILIRUB SERPL-MCNC: 0.2 MG/DL (ref 0.1–1)
BUN SERPL-MCNC: 20 MG/DL (ref 7–18)
BUN/CREAT SERPL: 16.9 (ref 5.4–32)
CALCIUM SERPL-MCNC: 8.9 MG/DL (ref 8.5–10.1)
CHLORIDE SERPL-SCNC: 105 MMOL/L (ref 99–107)
CO2 SERPL-SCNC: 24 MMOL/L (ref 24–32)
CREAT SERPL-MCNC: 1.18 MG/DL (ref 0.6–1.1)
CRP SERPL HS-MCNC: 0.23 MG/DL (ref 0–0.5)
EOSINOPHIL # BLD AUTO: 0.1 X10'3 (ref 0–0.9)
EOSINOPHIL NFR BLD AUTO: 1.7 % (ref 0–6)
ERYTHROCYTE [DISTWIDTH] IN BLOOD BY AUTOMATED COUNT: 13.1 % (ref 11.5–14.5)
GFR SERPL CREATININE-BSD FRML MDRD: 70 ML/MIN
GLUCOSE SERPL-MCNC: 102 MG/DL (ref 70–104)
HCT VFR BLD AUTO: 45.3 % (ref 42–52)
HGB BLD-MCNC: 15.9 G/DL (ref 14–17.9)
LYMPHOCYTES # BLD AUTO: 1.6 X10'3 (ref 1.1–4.8)
LYMPHOCYTES NFR BLD AUTO: 19.1 % (ref 21–51)
MCH RBC QN AUTO: 31.4 PG (ref 27–31)
MCHC RBC AUTO-ENTMCNC: 35 G/DL (ref 33–36.5)
MCV RBC AUTO: 89.7 FL (ref 78–98)
MONOCYTES # BLD AUTO: 0.5 X10'3 (ref 0–0.9)
MONOCYTES NFR BLD AUTO: 5.7 % (ref 2–12)
NEUTROPHILS # BLD AUTO: 6.2 X10'3 (ref 1.8–7.7)
NEUTROPHILS NFR BLD AUTO: 72.4 % (ref 42–75)
PLATELET # BLD AUTO: 429 X10'3 (ref 140–440)
PMV BLD AUTO: 5.8 FL (ref 7.4–10.4)
POTASSIUM SERPL-SCNC: 3.7 MMOL/L (ref 3.5–5.1)
PROT SERPL-MCNC: 7.3 G/DL (ref 6.4–8.2)
RBC # BLD AUTO: 5.05 X10'6 (ref 4.7–6.1)
SODIUM SERPL-SCNC: 140 MMOL/L (ref 135–145)
WBC # BLD AUTO: 8.6 X10'3 (ref 4.5–11)

## 2019-10-15 PROCEDURE — 84145 PROCALCITONIN (PCT): CPT

## 2019-10-15 PROCEDURE — 73130 X-RAY EXAM OF HAND: CPT

## 2019-10-15 PROCEDURE — 99283 EMERGENCY DEPT VISIT LOW MDM: CPT

## 2019-10-15 PROCEDURE — 36415 COLL VENOUS BLD VENIPUNCTURE: CPT

## 2019-10-15 PROCEDURE — 80053 COMPREHEN METABOLIC PANEL: CPT

## 2019-10-15 PROCEDURE — 85610 PROTHROMBIN TIME: CPT

## 2019-10-15 PROCEDURE — 99284 EMERGENCY DEPT VISIT MOD MDM: CPT

## 2019-10-15 PROCEDURE — 86140 C-REACTIVE PROTEIN: CPT

## 2019-10-15 PROCEDURE — 85730 THROMBOPLASTIN TIME PARTIAL: CPT

## 2019-10-15 PROCEDURE — 29125 APPL SHORT ARM SPLINT STATIC: CPT

## 2019-10-15 PROCEDURE — 85025 COMPLETE CBC W/AUTO DIFF WBC: CPT

## 2019-10-15 SDOH — ECONOMIC STABILITY - INCOME SECURITY: UNEMPLOYMENT, UNSPECIFIED: Z56.0

## 2019-10-15 SDOH — ECONOMIC STABILITY - HOUSING INSECURITY: HOMELESSNESS: Z59.0

## 2019-12-17 ENCOUNTER — HOSPITAL ENCOUNTER (EMERGENCY)
Dept: HOSPITAL 94 - ER | Age: 34
Discharge: ANOTHER HOSPITAL | End: 2019-12-17
Payer: MEDICARE

## 2019-12-17 VITALS — SYSTOLIC BLOOD PRESSURE: 109 MMHG | DIASTOLIC BLOOD PRESSURE: 71 MMHG

## 2019-12-17 VITALS — HEIGHT: 71 IN | WEIGHT: 176.37 LBS | BODY MASS INDEX: 24.69 KG/M2

## 2019-12-17 DIAGNOSIS — F20.9: ICD-10-CM

## 2019-12-17 DIAGNOSIS — R41.82: ICD-10-CM

## 2019-12-17 DIAGNOSIS — T42.6X2A: Primary | ICD-10-CM

## 2019-12-17 DIAGNOSIS — F31.9: ICD-10-CM

## 2019-12-17 DIAGNOSIS — Z88.1: ICD-10-CM

## 2019-12-17 DIAGNOSIS — Z56.0: ICD-10-CM

## 2019-12-17 DIAGNOSIS — F15.90: ICD-10-CM

## 2019-12-17 DIAGNOSIS — J96.00: ICD-10-CM
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
## 2019-12-17 DIAGNOSIS — G89.29: ICD-10-CM

## 2019-12-17 DIAGNOSIS — F41.9: ICD-10-CM

## 2019-12-17 DIAGNOSIS — Z59.0: ICD-10-CM

## 2019-12-17 DIAGNOSIS — Z79.899: ICD-10-CM

## 2019-12-17 LAB
ALBUMIN SERPL BCP-MCNC: 3.9 G/DL (ref 3.4–5)
ALBUMIN/GLOB SERPL: 1.4 {RATIO} (ref 1.1–1.5)
ALP SERPL-CCNC: 61 IU/L (ref 46–116)
ALT SERPL W P-5'-P-CCNC: 24 U/L (ref 12–78)
AMPHETAMINES UR QL SCN: POSITIVE
ANION GAP SERPL CALCULATED.3IONS-SCNC: 11 MMOL/L (ref 8–16)
APAP SERPL-MCNC: < 2 UG/ML (ref 10–30)
AST SERPL W P-5'-P-CCNC: 15 U/L (ref 10–37)
BARBITURATES UR QL SCN: NEGATIVE
BASE EXCESS BLDA CALC-SCNC: -5.3 MMOL/L (ref -2–3)
BASOPHILS # BLD AUTO: 0 X10'3 (ref 0–0.2)
BASOPHILS NFR BLD AUTO: 0.6 % (ref 0–1)
BENZODIAZ UR QL SCN: NEGATIVE
BILIRUB SERPL-MCNC: 0.2 MG/DL (ref 0.1–1)
BODY TEMPERATURE: 35.3
BUN SERPL-MCNC: 15 MG/DL (ref 7–18)
BUN/CREAT SERPL: 15.5 (ref 5.4–32)
BZE UR QL SCN: NEGATIVE
CALCIUM SERPL-MCNC: 8.7 MG/DL (ref 8.5–10.1)
CANNABINOIDS UR QL SCN: NEGATIVE
CHLORIDE SERPL-SCNC: 105 MMOL/L (ref 99–107)
CO2 SERPL-SCNC: 23.8 MMOL/L (ref 24–32)
COHGB MFR BLDA: 1.8 % (ref 0.5–1.5)
CREAT SERPL-MCNC: 0.97 MG/DL (ref 0.6–1.1)
EOSINOPHIL # BLD AUTO: 0 X10'3 (ref 0–0.9)
EOSINOPHIL NFR BLD AUTO: 0.6 % (ref 0–6)
ERYTHROCYTE [DISTWIDTH] IN BLOOD BY AUTOMATED COUNT: 13 % (ref 11.5–14.5)
ETHANOL SERPL-MCNC: 0.04 GM/DL (ref 0–0.01)
GFR SERPL CREATININE-BSD FRML MDRD: 88 ML/MIN
GLUCOSE SERPL-MCNC: 106 MG/DL (ref 70–104)
HCO3 BLDA-SCNC: 18.3 MMOL/L (ref 22–26)
HCT VFR BLD AUTO: 43.4 % (ref 42–52)
HGB BLD-MCNC: 15.3 G/DL (ref 14–17.9)
HGB BLDA-MCNC: 14.5 G/DL (ref 14–17.9)
LYMPHOCYTES # BLD AUTO: 1.2 X10'3 (ref 1.1–4.8)
LYMPHOCYTES NFR BLD AUTO: 17.2 % (ref 21–51)
MAGNESIUM SERPL-MCNC: 2 MG/DL (ref 1.5–2.4)
MCH RBC QN AUTO: 31.8 PG (ref 27–31)
MCHC RBC AUTO-ENTMCNC: 35.3 G/DL (ref 33–36.5)
MCV RBC AUTO: 89.9 FL (ref 78–98)
METHADONE UR QL SCN: NEGATIVE
METHGB MFR BLDA: 0 % (ref 0.3–1.12)
MONOCYTES # BLD AUTO: 0.3 X10'3 (ref 0–0.9)
MONOCYTES NFR BLD AUTO: 4.5 % (ref 2–12)
NEUTROPHILS # BLD AUTO: 5.6 X10'3 (ref 1.8–7.7)
NEUTROPHILS NFR BLD AUTO: 77.1 % (ref 42–75)
O2/TOTAL GAS SETTING VFR VENT: 60 MMHG/%
OPIATES UR QL SCN: NEGATIVE
OXYHGB MFR BLDA: 96.7 % (ref 94–100)
PCO2 TEMP ADJ BLDA: 28.4 MMHG (ref 35–45)
PCP UR QL SCN: NEGATIVE
PEEP RESPIRATORY: 5 CM H2O
PH TEMP ADJ BLDA: 7.42 [PH] (ref 7.35–7.45)
PLATELET # BLD AUTO: 344 X10'3 (ref 140–440)
PMV BLD AUTO: 5.8 FL (ref 7.4–10.4)
PO2 TEMP ADJ BLD: 155.3 MMHG (ref 83–108)
POTASSIUM SERPL-SCNC: 3.5 MMOL/L (ref 3.5–5.1)
PROT SERPL-MCNC: 6.6 G/DL (ref 6.4–8.2)
RBC # BLD AUTO: 4.83 X10'6 (ref 4.7–6.1)
RESP RATE 1H: 16 B/MIN
RESP RATE RESTING: 22 B/MIN
SAO2 % BLDA: 98.5 % (ref 95–98)
SODIUM SERPL-SCNC: 140 MMOL/L (ref 135–145)
SPONT VT COMPRES GAS VOL SET UNCOR VENT: 450 ML
TRIGL SERPL-MCNC: 112 MG/DL (ref 20–135)
VOL.EXP/M/BW ON VENT: 11 L/MIN
WBC # BLD AUTO: 7.3 X10'3 (ref 4.5–11)

## 2019-12-17 PROCEDURE — 84478 ASSAY OF TRIGLYCERIDES: CPT

## 2019-12-17 PROCEDURE — 85025 COMPLETE CBC W/AUTO DIFF WBC: CPT

## 2019-12-17 PROCEDURE — 96374 THER/PROPH/DIAG INJ IV PUSH: CPT

## 2019-12-17 PROCEDURE — 36600 WITHDRAWAL OF ARTERIAL BLOOD: CPT

## 2019-12-17 PROCEDURE — 80329 ANALGESICS NON-OPIOID 1 OR 2: CPT

## 2019-12-17 PROCEDURE — 85018 HEMOGLOBIN: CPT

## 2019-12-17 PROCEDURE — 93005 ELECTROCARDIOGRAM TRACING: CPT

## 2019-12-17 PROCEDURE — 31500 INSERT EMERGENCY AIRWAY: CPT

## 2019-12-17 PROCEDURE — 82803 BLOOD GASES ANY COMBINATION: CPT

## 2019-12-17 PROCEDURE — 36415 COLL VENOUS BLD VENIPUNCTURE: CPT

## 2019-12-17 PROCEDURE — 71045 X-RAY EXAM CHEST 1 VIEW: CPT

## 2019-12-17 PROCEDURE — 96375 TX/PRO/DX INJ NEW DRUG ADDON: CPT

## 2019-12-17 PROCEDURE — 83735 ASSAY OF MAGNESIUM: CPT

## 2019-12-17 PROCEDURE — 96376 TX/PRO/DX INJ SAME DRUG ADON: CPT

## 2019-12-17 PROCEDURE — 80320 DRUG SCREEN QUANTALCOHOLS: CPT

## 2019-12-17 PROCEDURE — 96361 HYDRATE IV INFUSION ADD-ON: CPT

## 2019-12-17 PROCEDURE — 80053 COMPREHEN METABOLIC PANEL: CPT

## 2019-12-17 PROCEDURE — 87070 CULTURE OTHR SPECIMN AEROBIC: CPT

## 2019-12-17 PROCEDURE — 99291 CRITICAL CARE FIRST HOUR: CPT

## 2019-12-17 PROCEDURE — 94002 VENT MGMT INPAT INIT DAY: CPT

## 2019-12-17 PROCEDURE — 80305 DRUG TEST PRSMV DIR OPT OBS: CPT

## 2019-12-17 PROCEDURE — 94760 N-INVAS EAR/PLS OXIMETRY 1: CPT

## 2019-12-17 RX ADMIN — POTASSIUM CHLORIDE SCH MEQ: 7.46 INJECTION, SOLUTION INTRAVENOUS at 16:50

## 2019-12-17 RX ADMIN — POTASSIUM CHLORIDE SCH MEQ: 7.46 INJECTION, SOLUTION INTRAVENOUS at 17:21

## 2019-12-17 RX ADMIN — POTASSIUM CHLORIDE SCH MEQ: 7.46 INJECTION, SOLUTION INTRAVENOUS at 19:38

## 2019-12-17 SDOH — ECONOMIC STABILITY - HOUSING INSECURITY: HOMELESSNESS: Z59.0

## 2019-12-17 SDOH — ECONOMIC STABILITY - INCOME SECURITY: UNEMPLOYMENT, UNSPECIFIED: Z56.0

## 2019-12-17 NOTE — NUR
Narcan 1mg IV given as ordered by Dr. Prakash for decreased respiratory status 
following the overdose. Pt shows no signs of status change following the narcan 
dose IVP.

## 2019-12-17 NOTE — NUR
Pt found sitting upright on the end of gurney but is not awake and interactive. 
Pt assisted by three staff members back to bed, respiratory rate diminished to 
approximately 6-8 breaths per minute. Pt's skin color is more gray and pale 
than upon arrival to the ED. Pt is not responding painful stimuli. Dr. Prakash to 
the room to assess the patient.

## 2019-12-17 NOTE — NUR
Pt moved from ED bed 14 to bed 3 and preparations for intubation are being 
made. RSI to protect the patient's airway at this time.

## 2019-12-17 NOTE — NUR
PATIENT CONTINUES TO EXPERIENCE SEIZURE, DR ARIAS AT BEDSIDE, ATIVEN 4MG IVP 
GIVEN, PATIENT ABLE TO RESPOND TO VERBAL STIMULI, WHEN MD ASKED IF HE IS "OKAY" 
PATIENT OPENED EYES AND SHAKED HIS HEAD "NO".

## 2019-12-17 NOTE — NUR
70 GORDON 1453. 20 ETOM 1454. HYPEROXYGENING, HR 99, 100% 135/88. 7.5 TUBE SIZE. 
RT X2 RN X2. MD ARIAS INTUBATING AT 1455. COLOR CHANGE NOTED.

## 2019-12-17 NOTE — NUR
Patient resting comfortably with lights off and limited noise. Patient becomes 
agitated attempting to flail arms and swinging legs about even with light 
tough.

## 2019-12-17 NOTE — NUR
DR ZAWADA IN TO ASSESS PATIENT, PATIENT CONTINUES TO EXPERIENCE SEIZURES, DR ZAWADA CONSULTING WITH DR ARIAS.

## 2019-12-17 NOTE — NUR
poison control  contacted, advised that the peak of this drug is 1.5-5hrs and 
the following instructions were given

-QTC >500 give mag

-maximize electrolytes

-QRS>120 give sodium bicarb 

-continuous cardiac monitoring, seizures are highly likely so give benzos in 
this event and get baseline EKG.

## 2020-09-17 ENCOUNTER — HOSPITAL ENCOUNTER (EMERGENCY)
Dept: HOSPITAL 94 - ER | Age: 35
Discharge: HOME | End: 2020-09-17
Payer: MEDICARE

## 2020-09-17 VITALS — DIASTOLIC BLOOD PRESSURE: 95 MMHG | SYSTOLIC BLOOD PRESSURE: 145 MMHG

## 2020-09-17 VITALS — WEIGHT: 212.75 LBS | BODY MASS INDEX: 33.39 KG/M2 | HEIGHT: 67 IN

## 2020-09-17 DIAGNOSIS — Z59.0: ICD-10-CM

## 2020-09-17 DIAGNOSIS — F15.90: ICD-10-CM
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
## 2020-09-17 DIAGNOSIS — Z88.0: ICD-10-CM

## 2020-09-17 DIAGNOSIS — Z56.0: ICD-10-CM

## 2020-09-17 DIAGNOSIS — S66.912A: Primary | ICD-10-CM
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
## 2020-09-17 DIAGNOSIS — F41.9: ICD-10-CM
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
## 2020-09-17 DIAGNOSIS — F31.9: ICD-10-CM

## 2020-09-17 DIAGNOSIS — F20.9: ICD-10-CM

## 2020-09-17 DIAGNOSIS — G89.29: ICD-10-CM

## 2020-09-17 DIAGNOSIS — Z79.899: ICD-10-CM
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
## 2020-09-17 PROCEDURE — 99283 EMERGENCY DEPT VISIT LOW MDM: CPT

## 2020-09-17 PROCEDURE — 73130 X-RAY EXAM OF HAND: CPT

## 2020-09-17 SDOH — ECONOMIC STABILITY - HOUSING INSECURITY: HOMELESSNESS: Z59.0

## 2020-09-17 SDOH — ECONOMIC STABILITY - INCOME SECURITY: UNEMPLOYMENT, UNSPECIFIED: Z56.0

## 2020-09-24 ENCOUNTER — HOSPITAL ENCOUNTER (EMERGENCY)
Dept: HOSPITAL 94 - ER | Age: 35
Discharge: HOME | End: 2020-09-24
Payer: MEDICARE

## 2020-09-24 VITALS — WEIGHT: 195.42 LBS | BODY MASS INDEX: 31.41 KG/M2 | HEIGHT: 66 IN

## 2020-09-24 VITALS — SYSTOLIC BLOOD PRESSURE: 151 MMHG | DIASTOLIC BLOOD PRESSURE: 100 MMHG

## 2020-09-24 VITALS — SYSTOLIC BLOOD PRESSURE: 153 MMHG | DIASTOLIC BLOOD PRESSURE: 101 MMHG

## 2020-09-24 VITALS — HEIGHT: 66 IN | WEIGHT: 190.39 LBS | BODY MASS INDEX: 30.6 KG/M2

## 2020-09-24 DIAGNOSIS — F20.9: ICD-10-CM

## 2020-09-24 DIAGNOSIS — Z59.0: ICD-10-CM

## 2020-09-24 DIAGNOSIS — F31.9: Primary | ICD-10-CM

## 2020-09-24 DIAGNOSIS — Z88.8: ICD-10-CM

## 2020-09-24 DIAGNOSIS — Z00.00: ICD-10-CM

## 2020-09-24 DIAGNOSIS — F41.9: ICD-10-CM

## 2020-09-24 DIAGNOSIS — F15.90: ICD-10-CM

## 2020-09-24 DIAGNOSIS — G89.29: ICD-10-CM

## 2020-09-24 DIAGNOSIS — Z56.0: ICD-10-CM

## 2020-09-24 DIAGNOSIS — Z79.899: ICD-10-CM

## 2020-09-24 PROCEDURE — 99283 EMERGENCY DEPT VISIT LOW MDM: CPT

## 2020-09-24 PROCEDURE — 99281 EMR DPT VST MAYX REQ PHY/QHP: CPT

## 2020-09-24 SDOH — ECONOMIC STABILITY - INCOME SECURITY: UNEMPLOYMENT, UNSPECIFIED: Z56.0

## 2020-09-24 SDOH — ECONOMIC STABILITY - HOUSING INSECURITY: HOMELESSNESS: Z59.0

## 2021-01-28 ENCOUNTER — HOSPITAL ENCOUNTER (EMERGENCY)
Dept: HOSPITAL 94 - ER | Age: 36
Discharge: HOME | End: 2021-01-28
Payer: MEDICARE

## 2021-01-28 VITALS — SYSTOLIC BLOOD PRESSURE: 143 MMHG | DIASTOLIC BLOOD PRESSURE: 109 MMHG

## 2021-01-28 VITALS — BODY MASS INDEX: 30.6 KG/M2 | WEIGHT: 190.39 LBS | HEIGHT: 66 IN

## 2021-01-28 DIAGNOSIS — F41.9: ICD-10-CM

## 2021-01-28 DIAGNOSIS — Z59.0: ICD-10-CM

## 2021-01-28 DIAGNOSIS — F31.9: ICD-10-CM

## 2021-01-28 DIAGNOSIS — Z79.899: ICD-10-CM

## 2021-01-28 DIAGNOSIS — Z56.0: ICD-10-CM

## 2021-01-28 DIAGNOSIS — F20.9: ICD-10-CM

## 2021-01-28 DIAGNOSIS — F15.10: Primary | ICD-10-CM

## 2021-01-28 DIAGNOSIS — G89.29: ICD-10-CM

## 2021-01-28 DIAGNOSIS — Z88.1: ICD-10-CM

## 2021-01-28 DIAGNOSIS — F12.90: ICD-10-CM

## 2021-01-28 DIAGNOSIS — Z72.89: ICD-10-CM

## 2021-01-28 PROCEDURE — 99281 EMR DPT VST MAYX REQ PHY/QHP: CPT

## 2021-01-28 SDOH — ECONOMIC STABILITY - INCOME SECURITY: UNEMPLOYMENT, UNSPECIFIED: Z56.0

## 2021-01-28 SDOH — ECONOMIC STABILITY - HOUSING INSECURITY: HOMELESSNESS: Z59.0

## 2021-03-02 ENCOUNTER — HOSPITAL ENCOUNTER (EMERGENCY)
Dept: HOSPITAL 94 - ER | Age: 36
Discharge: HOME | End: 2021-03-02
Payer: MEDICARE

## 2021-03-02 VITALS — SYSTOLIC BLOOD PRESSURE: 134 MMHG | DIASTOLIC BLOOD PRESSURE: 60 MMHG

## 2021-03-02 VITALS — HEIGHT: 66 IN | BODY MASS INDEX: 33.23 KG/M2 | WEIGHT: 206.79 LBS

## 2021-03-02 DIAGNOSIS — G89.29: ICD-10-CM

## 2021-03-02 DIAGNOSIS — Z79.899: ICD-10-CM

## 2021-03-02 DIAGNOSIS — Z56.0: ICD-10-CM

## 2021-03-02 DIAGNOSIS — Z88.1: ICD-10-CM

## 2021-03-02 DIAGNOSIS — F17.200: ICD-10-CM

## 2021-03-02 DIAGNOSIS — F15.90: ICD-10-CM

## 2021-03-02 DIAGNOSIS — F20.9: ICD-10-CM

## 2021-03-02 DIAGNOSIS — K08.89: Primary | ICD-10-CM

## 2021-03-02 DIAGNOSIS — F41.9: ICD-10-CM

## 2021-03-02 DIAGNOSIS — F31.9: ICD-10-CM

## 2021-03-02 DIAGNOSIS — Z59.0: ICD-10-CM

## 2021-03-02 PROCEDURE — 99283 EMERGENCY DEPT VISIT LOW MDM: CPT

## 2021-03-02 SDOH — ECONOMIC STABILITY - HOUSING INSECURITY: HOMELESSNESS: Z59.0

## 2021-03-02 SDOH — ECONOMIC STABILITY - INCOME SECURITY: UNEMPLOYMENT, UNSPECIFIED: Z56.0

## 2021-03-11 ENCOUNTER — HOSPITAL ENCOUNTER (EMERGENCY)
Dept: HOSPITAL 94 - ER | Age: 36
Discharge: LEFT BEFORE DISCHARGE | End: 2021-03-11
Payer: MEDICARE

## 2021-03-11 VITALS — DIASTOLIC BLOOD PRESSURE: 80 MMHG | SYSTOLIC BLOOD PRESSURE: 140 MMHG

## 2021-03-11 VITALS — BODY MASS INDEX: 31.89 KG/M2 | HEIGHT: 66 IN | WEIGHT: 198.42 LBS

## 2021-03-11 DIAGNOSIS — K08.89: Primary | ICD-10-CM

## 2021-03-11 DIAGNOSIS — Z53.21: ICD-10-CM

## 2021-05-08 ENCOUNTER — HOSPITAL ENCOUNTER (EMERGENCY)
Dept: HOSPITAL 94 - ER | Age: 36
Discharge: LEFT BEFORE DISCHARGE | End: 2021-05-08
Payer: MEDICARE

## 2021-05-08 VITALS — BODY MASS INDEX: 37.3 KG/M2 | HEIGHT: 70 IN | WEIGHT: 260.54 LBS

## 2021-05-08 VITALS — SYSTOLIC BLOOD PRESSURE: 139 MMHG | DIASTOLIC BLOOD PRESSURE: 97 MMHG

## 2021-05-08 DIAGNOSIS — Z88.1: ICD-10-CM

## 2021-05-08 DIAGNOSIS — F41.9: ICD-10-CM

## 2021-05-08 DIAGNOSIS — Z56.0: ICD-10-CM

## 2021-05-08 DIAGNOSIS — Z00.00: Primary | ICD-10-CM

## 2021-05-08 DIAGNOSIS — F20.9: ICD-10-CM

## 2021-05-08 DIAGNOSIS — F31.9: ICD-10-CM

## 2021-05-08 DIAGNOSIS — G89.29: ICD-10-CM

## 2021-05-08 DIAGNOSIS — Z59.0: ICD-10-CM

## 2021-05-08 DIAGNOSIS — Z79.899: ICD-10-CM

## 2021-05-08 DIAGNOSIS — F15.90: ICD-10-CM

## 2021-05-08 LAB
ALBUMIN SERPL BCP-MCNC: 4 G/DL (ref 3.4–5)
ALBUMIN/GLOB SERPL: 1.3 {RATIO} (ref 1.1–1.5)
ALP SERPL-CCNC: 75 IU/L (ref 46–116)
ALT SERPL W P-5'-P-CCNC: 27 U/L (ref 12–78)
ANION GAP SERPL CALCULATED.3IONS-SCNC: 10 MMOL/L (ref 8–16)
AST SERPL W P-5'-P-CCNC: 13 U/L (ref 10–37)
BASOPHILS # BLD AUTO: 0.1 X10'3 (ref 0–0.2)
BASOPHILS NFR BLD AUTO: 0.8 % (ref 0–1)
BILIRUB SERPL-MCNC: 0.8 MG/DL (ref 0.1–1)
BUN SERPL-MCNC: 23 MG/DL (ref 7–18)
BUN/CREAT SERPL: 17 (ref 5.4–32)
CALCIUM SERPL-MCNC: 9.1 MG/DL (ref 8.5–10.1)
CHLORIDE SERPL-SCNC: 106 MMOL/L (ref 99–107)
CO2 SERPL-SCNC: 26.5 MMOL/L (ref 24–32)
CREAT SERPL-MCNC: 1.35 MG/DL (ref 0.6–1.1)
EOSINOPHIL # BLD AUTO: 0.1 X10'3 (ref 0–0.9)
EOSINOPHIL NFR BLD AUTO: 0.8 % (ref 0–6)
ERYTHROCYTE [DISTWIDTH] IN BLOOD BY AUTOMATED COUNT: 12.9 % (ref 11.5–14.5)
ETHANOL SERPL-MCNC: < 0.01 GM/DL (ref 0–0.01)
GFR SERPL CREATININE-BSD FRML MDRD: 59 ML/MIN
GLUCOSE SERPL-MCNC: 86 MG/DL (ref 70–104)
HCT VFR BLD AUTO: 46.4 % (ref 42–52)
HGB BLD-MCNC: 16.2 G/DL (ref 14–17.9)
LYMPHOCYTES # BLD AUTO: 1.8 X10'3 (ref 1.1–4.8)
LYMPHOCYTES NFR BLD AUTO: 26 % (ref 21–51)
MCH RBC QN AUTO: 31.2 PG (ref 27–31)
MCHC RBC AUTO-ENTMCNC: 34.8 G/DL (ref 33–36.5)
MCV RBC AUTO: 89.6 FL (ref 78–98)
MONOCYTES # BLD AUTO: 0.5 X10'3 (ref 0–0.9)
MONOCYTES NFR BLD AUTO: 7.1 % (ref 2–12)
NEUTROPHILS # BLD AUTO: 4.5 X10'3 (ref 1.8–7.7)
NEUTROPHILS NFR BLD AUTO: 65.3 % (ref 42–75)
PLATELET # BLD AUTO: 390 X10'3 (ref 140–440)
PMV BLD AUTO: 5.9 FL (ref 7.4–10.4)
POTASSIUM SERPL-SCNC: 4.2 MMOL/L (ref 3.5–5.1)
PROT SERPL-MCNC: 7.2 G/DL (ref 6.4–8.2)
RBC # BLD AUTO: 5.18 X10'6 (ref 4.7–6.1)
SODIUM SERPL-SCNC: 142 MMOL/L (ref 135–145)
WBC # BLD AUTO: 6.9 X10'3 (ref 4.5–11)

## 2021-05-08 PROCEDURE — 85025 COMPLETE CBC W/AUTO DIFF WBC: CPT

## 2021-05-08 PROCEDURE — 36415 COLL VENOUS BLD VENIPUNCTURE: CPT

## 2021-05-08 PROCEDURE — 80053 COMPREHEN METABOLIC PANEL: CPT

## 2021-05-08 PROCEDURE — 80320 DRUG SCREEN QUANTALCOHOLS: CPT

## 2021-05-08 PROCEDURE — 93005 ELECTROCARDIOGRAM TRACING: CPT

## 2021-05-08 PROCEDURE — 99284 EMERGENCY DEPT VISIT MOD MDM: CPT

## 2021-05-08 SDOH — ECONOMIC STABILITY - HOUSING INSECURITY: HOMELESSNESS: Z59.0

## 2021-05-08 SDOH — ECONOMIC STABILITY - INCOME SECURITY: UNEMPLOYMENT, UNSPECIFIED: Z56.0

## 2021-05-08 NOTE — NUR
PT REFUSED TO ANSWER QUESTIONS TO DR CARVAJAL, ONCE LEFT PT GOT AGGITATED AND 
STATED HE WANTED TO LEAVE. CALLED SECURITY FOR ESCORT. PT STATED HE WAS ON A 
SPIRITUAL JOURNEY WITH GOD AND WAS INTERUPTED. PT STATES HE DOES NOT WANT IV 
FLUIDS ONCE HOOKED UP AND AFTER LABS DRAWN. PT INDEIFFERENT ABOUT STAYING TO BE 
SEEN AND STATED HE DOESNT NEED ANYTHING. PT AGREED TO HAVE BUD PASS TO GET BACK 
TO CAR AND ONCE IV REMOVED AND BUS PASS GIVEN TO PT, PT STATED HE NEEDS TO BE 
SEEN AND LAYED BACK ON BED. DR CALDWELL STATED TO AWAIT FOR LAB RESULTS.

## 2021-05-12 ENCOUNTER — HOSPITAL ENCOUNTER (EMERGENCY)
Dept: HOSPITAL 94 - ER | Age: 36
LOS: 1 days | Discharge: PSYCHIATRIC OR SUBSTANCE USE FACILITY | End: 2021-05-13
Payer: MEDICARE

## 2021-05-12 VITALS — BODY MASS INDEX: 30.65 KG/M2 | HEIGHT: 66 IN | WEIGHT: 190.7 LBS

## 2021-05-12 DIAGNOSIS — Z88.1: ICD-10-CM

## 2021-05-12 DIAGNOSIS — F17.200: ICD-10-CM

## 2021-05-12 DIAGNOSIS — F20.9: ICD-10-CM

## 2021-05-12 DIAGNOSIS — F29: Primary | ICD-10-CM

## 2021-05-12 DIAGNOSIS — G89.29: ICD-10-CM

## 2021-05-12 DIAGNOSIS — Z79.899: ICD-10-CM

## 2021-05-12 DIAGNOSIS — F15.10: ICD-10-CM

## 2021-05-12 DIAGNOSIS — Z56.0: ICD-10-CM

## 2021-05-12 DIAGNOSIS — F41.9: ICD-10-CM

## 2021-05-12 DIAGNOSIS — F31.9: ICD-10-CM

## 2021-05-12 DIAGNOSIS — R45.851: ICD-10-CM

## 2021-05-12 DIAGNOSIS — Z20.822: ICD-10-CM

## 2021-05-12 DIAGNOSIS — Z59.0: ICD-10-CM

## 2021-05-12 LAB
ALBUMIN SERPL BCP-MCNC: 3.2 G/DL (ref 3.4–5)
ALBUMIN/GLOB SERPL: 1.2 {RATIO} (ref 1.1–1.5)
ALP SERPL-CCNC: 64 IU/L (ref 46–116)
ALT SERPL W P-5'-P-CCNC: 21 U/L (ref 12–78)
AMPHETAMINES UR QL SCN: POSITIVE
ANION GAP SERPL CALCULATED.3IONS-SCNC: 12 MMOL/L (ref 8–16)
AST SERPL W P-5'-P-CCNC: 13 U/L (ref 10–37)
BACTERIA URNS QL MICRO: (no result) /HPF
BARBITURATES UR QL SCN: NEGATIVE
BASOPHILS # BLD AUTO: 0 X10'3 (ref 0–0.2)
BASOPHILS NFR BLD AUTO: 0.3 % (ref 0–1)
BENZODIAZ UR QL SCN: POSITIVE
BILIRUB SERPL-MCNC: 0.7 MG/DL (ref 0.1–1)
BUN SERPL-MCNC: 18 MG/DL (ref 7–18)
BUN/CREAT SERPL: 20 (ref 5.4–32)
BZE UR QL SCN: NEGATIVE
CALCIUM SERPL-MCNC: 7.7 MG/DL (ref 8.5–10.1)
CANNABINOIDS UR QL SCN: NEGATIVE
CHLORIDE SERPL-SCNC: 108 MMOL/L (ref 99–107)
CLARITY UR: (no result)
CO2 SERPL-SCNC: 18 MMOL/L (ref 24–32)
COLOR UR: YELLOW
CREAT SERPL-MCNC: 0.9 MG/DL (ref 0.6–1.1)
DEPRECATED SQUAMOUS URNS QL MICRO: (no result) /LPF
EOSINOPHIL # BLD AUTO: 0 X10'3 (ref 0–0.9)
EOSINOPHIL NFR BLD AUTO: 0.3 % (ref 0–6)
ERYTHROCYTE [DISTWIDTH] IN BLOOD BY AUTOMATED COUNT: 13.1 % (ref 11.5–14.5)
ETHANOL SERPL-MCNC: < 0.01 GM/DL (ref 0–0.01)
GFR SERPL CREATININE-BSD FRML MDRD: > 90 ML/MIN
GLUCOSE SERPL-MCNC: 72 MG/DL (ref 70–104)
GLUCOSE UR STRIP-MCNC: NEGATIVE MG/DL
HCT VFR BLD AUTO: 41 % (ref 42–52)
HGB BLD-MCNC: 14 G/DL (ref 14–17.9)
HGB UR QL STRIP: NEGATIVE
KETONES UR STRIP-MCNC: >=80 MG/DL
LEUKOCYTE ESTERASE UR QL STRIP: NEGATIVE
LYMPHOCYTES # BLD AUTO: 0.7 X10'3 (ref 1.1–4.8)
LYMPHOCYTES NFR BLD AUTO: 8.4 % (ref 21–51)
MCH RBC QN AUTO: 31.1 PG (ref 27–31)
MCHC RBC AUTO-ENTMCNC: 34.1 G/DL (ref 33–36.5)
MCV RBC AUTO: 91 FL (ref 78–98)
METHADONE UR QL SCN: NEGATIVE
MONOCYTES # BLD AUTO: 0.3 X10'3 (ref 0–0.9)
MONOCYTES NFR BLD AUTO: 3.3 % (ref 2–12)
MUCOUS THREADS URNS QL MICRO: (no result) /LPF
NEUTROPHILS # BLD AUTO: 7.7 X10'3 (ref 1.8–7.7)
NEUTROPHILS NFR BLD AUTO: 87.7 % (ref 42–75)
NITRITE UR QL STRIP: NEGATIVE
OPIATES UR QL SCN: NEGATIVE
PCP UR QL SCN: NEGATIVE
PH UR STRIP: 5.5 [PH] (ref 4.8–8)
PLATELET # BLD AUTO: 331 X10'3 (ref 140–440)
PMV BLD AUTO: 5.7 FL (ref 7.4–10.4)
POTASSIUM SERPL-SCNC: 3.9 MMOL/L (ref 3.5–5.1)
PROT SERPL-MCNC: 5.8 G/DL (ref 6.4–8.2)
PROT UR QL STRIP: NEGATIVE MG/DL
RBC # BLD AUTO: 4.51 X10'6 (ref 4.7–6.1)
RBC #/AREA URNS HPF: (no result) /HPF (ref 0–2)
SODIUM SERPL-SCNC: 138 MMOL/L (ref 135–145)
SP GR UR STRIP: >=1.03 (ref 1–1.03)
URN COLLECT METHOD CLASS: (no result)
UROBILINOGEN UR STRIP-MCNC: 0.2 E.U/DL (ref 0.2–1)
WBC # BLD AUTO: 8.7 X10'3 (ref 4.5–11)
WBC #/AREA URNS HPF: (no result) /HPF (ref 0–4)

## 2021-05-12 PROCEDURE — 80320 DRUG SCREEN QUANTALCOHOLS: CPT

## 2021-05-12 PROCEDURE — 84443 ASSAY THYROID STIM HORMONE: CPT

## 2021-05-12 PROCEDURE — 81001 URINALYSIS AUTO W/SCOPE: CPT

## 2021-05-12 PROCEDURE — 80053 COMPREHEN METABOLIC PANEL: CPT

## 2021-05-12 PROCEDURE — 85025 COMPLETE CBC W/AUTO DIFF WBC: CPT

## 2021-05-12 PROCEDURE — 36415 COLL VENOUS BLD VENIPUNCTURE: CPT

## 2021-05-12 PROCEDURE — 80305 DRUG TEST PRSMV DIR OPT OBS: CPT

## 2021-05-12 PROCEDURE — 99285 EMERGENCY DEPT VISIT HI MDM: CPT

## 2021-05-12 PROCEDURE — 87635 SARS-COV-2 COVID-19 AMP PRB: CPT

## 2021-05-12 SDOH — ECONOMIC STABILITY - INCOME SECURITY: UNEMPLOYMENT, UNSPECIFIED: Z56.0

## 2021-05-12 SDOH — ECONOMIC STABILITY - HOUSING INSECURITY: HOMELESSNESS: Z59.0

## 2021-05-12 NOTE — NUR
History  Chief Complaint   Patient presents with    Numbness     pt reports episode of left arm numbness when she was hanging cloths outside  pt reports she had no use of hand  Pt reports when this happened she "felt the left side of my face droop, but as fast as it came one, it went away "  pt reports episode lasted inbetween 30seconds to a minute  Pt reports lingering headache  Patient is a 78-year-old female presents today after experiencing an episode yesterday where she had some left facial numbness as well as the inability to control her left arm  She states the episode lasted approximately 1 minute  She also had associated headache with the symptoms  She told her  and daughter about the symptoms morning, and they recommended she come in to be checked out  Neurologic Problem   Location:  Left face left arm  Severity:  Moderate  Onset quality:  Sudden  Duration:  1 minute  Progression:  Resolved  Chronicity:  New  Context:  Was hanging laundry and could not manipulate the clothespin with her left hand and also noted some left facial numbness  Relieved by:  Nothing  Worsened by:  Nothing   Ineffective treatments:  Nothing  Associated symptoms: no abdominal pain, no chest pain, no congestion, no cough, no diarrhea, no ear pain, no fatigue, no fever, no headaches, no loss of consciousness, no myalgias, no nausea, no rash, no rhinorrhea, no shortness of breath, no sore throat, no vomiting and no wheezing        Prior to Admission Medications   Prescriptions Last Dose Informant Patient Reported? Taking? COD LIVER OIL PO   Yes Yes   Sig: Take by mouth      Facility-Administered Medications: None       History reviewed  No pertinent past medical history  History reviewed  No pertinent surgical history  History reviewed  No pertinent family history  I have reviewed and agree with the history as documented      Social History   Substance Use Topics    Smoking status: Never Smoker    Pt resting quietly, respirations normal, no s/s of distress. Smokeless tobacco: Never Used    Alcohol use No        Review of Systems   Constitutional: Negative for appetite change, chills, fatigue and fever  HENT: Negative for congestion, ear pain, facial swelling, nosebleeds, rhinorrhea and sore throat  Eyes: Negative for discharge and redness  Respiratory: Negative for cough, chest tightness, shortness of breath and wheezing  Cardiovascular: Negative for chest pain, palpitations and leg swelling  Gastrointestinal: Negative for abdominal pain, blood in stool, constipation, diarrhea, nausea and vomiting  Endocrine: Negative for polydipsia, polyphagia and polyuria  Genitourinary: Negative for difficulty urinating, dysuria, flank pain, frequency and hematuria  Musculoskeletal: Negative for arthralgias, myalgias and neck stiffness  Skin: Negative for pallor, rash and wound  Allergic/Immunologic: Negative for immunocompromised state  Neurological: Negative for dizziness, loss of consciousness, speech difficulty, light-headedness, numbness and headaches  Hematological: Does not bruise/bleed easily  Psychiatric/Behavioral: Negative for suicidal ideas  The patient is not nervous/anxious  All other systems reviewed and are negative  Physical Exam  ED Triage Vitals   Temperature Pulse Respirations Blood Pressure SpO2   10/07/17 1430 10/07/17 1420 10/07/17 1420 10/07/17 1420 10/07/17 1420   98 4 °F (36 9 °C) 61 18 (!) 182/90 98 %      Temp Source Heart Rate Source Patient Position - Orthostatic VS BP Location FiO2 (%)   10/07/17 1430 10/07/17 1420 10/07/17 1420 10/07/17 1420 --   Oral Monitor Lying Right arm       Pain Score       10/07/17 1420       2           Physical Exam   Constitutional: She is oriented to person, place, and time  She appears well-developed and well-nourished  HENT:   Head: Normocephalic and atraumatic  Eyes: Pupils are equal, round, and reactive to light  Neck: Normal range of motion  Neck supple     Cardiovascular: Normal rate and regular rhythm  Pulmonary/Chest: Effort normal and breath sounds normal    Abdominal: Soft  Bowel sounds are normal    Musculoskeletal: Normal range of motion  Neurological: She is alert and oriented to person, place, and time  Skin: Skin is warm and dry  Psychiatric: She has a normal mood and affect  Nursing note and vitals reviewed  ED Medications  Medications - No data to display    Diagnostic Studies  Labs Reviewed   COMPREHENSIVE METABOLIC PANEL - Abnormal        Result Value Ref Range Status    Sodium 135 (*) 136 - 145 mmol/L Final    Potassium 3 7  3 5 - 5 3 mmol/L Final    Chloride 103  100 - 108 mmol/L Final    CO2 28  21 - 32 mmol/L Final    Anion Gap 4  4 - 13 mmol/L Final    BUN 14  5 - 25 mg/dL Final    Creatinine 0 78  0 60 - 1 30 mg/dL Final    Comment: Standardized to IDMS reference method    Glucose 90  65 - 140 mg/dL Final    Comment:   If the patient is fasting, the ADA then defines impaired fasting glucose as > 100 mg/dL and diabetes as > or equal to 123 mg/dL  Specimen collection should occur prior to Sulfasalazine administration due to the potential for falsely depressed results  Specimen collection should occur prior to Sulfapyridine administration due to the potential for falsely elevated results  Calcium 9 0  8 3 - 10 1 mg/dL Final    AST 26  5 - 45 U/L Final    Comment:   Specimen collection should occur prior to Sulfasalazine administration due to the potential for falsely depressed results  ALT 32  12 - 78 U/L Final    Comment:   Specimen collection should occur prior to Sulfasalazine administration due to the potential for falsely depressed results       Alkaline Phosphatase 69  46 - 116 U/L Final    Total Protein 7 3  6 4 - 8 2 g/dL Final    Albumin 3 8  3 5 - 5 0 g/dL Final    Total Bilirubin 0 40  0 20 - 1 00 mg/dL Final    eGFR 84  ml/min/1 73sq m Final    Narrative:     National Kidney Disease Education Program recommendations are as Range = 60-90 seconds   FIBRINOGEN - Normal    Fibrinogen 284  227 - 495 mg/dL Final   POCT GLUCOSE - Normal    POC Glucose 88  65 - 140 mg/dl Final   EQUAL MIX, APTT   EQUAL MIX, PT / INR   EQUAL MIX,THROMBIN TIME   THROMBIN TIME   COAGULATION PROFILE    Narrative: The following orders were created for panel order Coagulation Profile  Procedure                               Abnormality         Status                     ---------                               -----------         ------                     Equal mix, EEOD[[id number]]                                   In process                 Equal mix, PT / MVJ[[id number]]                               In process                 Thrombin Time Mixing Study[[id number]]                        In process                 Platelet JWYRE[[id number]]                Normal              Final result               Protime-INR[[id number]]                   Abnormal            Final result               VDHE[[id number]]                          Normal              Final result               Thrombin time[[id number]]                                     In process                 Fibrinogen[[id number]]                    Normal              Final result                 Please view results for these tests on the individual orders  CT head without contrast   Final Result      2 separate areas of low density within the right hemisphere, both seen on series 2 image 18  The larger of the 2 measures approximately 2 2 cm involving cortex and adjacent white matter  Findings are nonspecific but may represent subacute infarcts  An    underlying mass cannot be excluded  Recommend MRI of the brain with contrast to include diffusion-weighted imaging to help differentiate infarct from mass  No hemorrhage identified           ##[email or web address]##imslh         Workstation performed: [id number]             Procedures  Procedures      Phone Contacts  ED Phone Contact    ED Course  ED Course       5:48 PM  EKG shows sinus bradycardia at 54 beats per minute normal axis normal intervals normal R progression T-wave inversion across the precordium as well as leads 3 and AVF  No old EKG available for comparison  NIH Stroke Scale    Flowsheet Row Most Recent Value   Level of Consciousness (1a )  0 Filed at: 10/07/2017 1410   LOC Questions (1b )  0 Filed at: 10/07/2017 1410   LOC Commands (1c )  0 Filed at: 10/07/2017 1410   Best Gaze (2 )  0 Filed at: 10/07/2017 1410   Visual (3 )  0 Filed at: 10/07/2017 1410   Facial Palsy (4 )  0 Filed at: 10/07/2017 1410   Motor Arm, Left (5a )  0 Filed at: 10/07/2017 1410   Motor Arm, Right (5b )  0 Filed at: 10/07/2017 1410   Motor Leg, Left (6a )  0 Filed at: 10/07/2017 1410   Motor Leg, Right (6b )  0 Filed at: 10/07/2017 1410   Limb Ataxia (7 )  0 Filed at: 10/07/2017 1410   Sensory (8 )  0 Filed at: 10/07/2017 1410   Best Language (9 )  0 Filed at: 10/07/2017 1410   Dysarthria (10 )  0 Filed at: 10/07/2017 1410   Extinction and Inattention (11 ) (Formerly Neglect)  0 Filed at: 10/07/2017 1410   Total  0 Filed at: 10/07/2017 1410             5:48 PM  CT scan shows 2 separate areas of low density within the right hemisphere, both seen on series 2 image 18  The larger of the 2 measures approximately 2 2 cm involving cortex and adjacent white matter  Findings are nonspecific but may represent subacute infarcts  An   underlying mass cannot be excluded  Recommend MRI of the brain with contrast to include diffusion-weighted imaging to help differentiate infarct from mass      No hemorrhage identified  Discussed with hospitalist  We had a detailed discussion of the patient's condition and case,  including need for admission  Accepts to his/her service  Bed request/bridging orders placed                  MDM  Number of Diagnoses or Management Options  Diagnosis management comments: 2:55 PM  MDM: Patient presents to the Emergency Department and was diagnosed with possible TIA   This is a new problem that will require additional planned work-up in a hospitalized setting  Clinical laboratory testing, radiology imaging, and medical testing (EKG) were ordered  I independently reviewed the radiologic imaging, EKG, and laboratory studies  This case is considered high risk secondary to the above listed disease process that poses a threat to bodily function that requires further diagnostic testing and management which may include the administration of parenteral controlled substances  Amount and/or Complexity of Data Reviewed  Clinical lab tests: ordered and reviewed  Tests in the radiology section of CPT®: ordered and reviewed  Tests in the medicine section of CPT®: ordered and reviewed  Decide to obtain previous medical records or to obtain history from someone other than the patient: yes  Review and summarize past medical records: yes  Discuss the patient with other providers: yes  Independent visualization of images, tracings, or specimens: yes    Risk of Complications, Morbidity, and/or Mortality  Presenting problems: high  Diagnostic procedures: high  Management options: high    Patient Progress  Patient progress: stable    CritCare Time    Disposition  Final diagnoses:   TIA (transient ischemic attack)     ED Disposition     ED Disposition Condition Comment    Admit  Case was discussed with hospitalist and the patient's admission status was agreed to be Admission Status: observation status to the service of Dr Rebeca Jose   Follow-up Information    None       Patient's Medications   Discharge Prescriptions    No medications on file     No discharge procedures on file      ED Provider  Electronically Signed by       Radha Norton DO  10/07/17 8459

## 2021-05-13 ENCOUNTER — HOSPITAL ENCOUNTER (INPATIENT)
Dept: HOSPITAL 94 - ADULT MH | Age: 36
LOS: 3 days | Discharge: HOME | DRG: 885 | End: 2021-05-16
Attending: PSYCHIATRY & NEUROLOGY | Admitting: PSYCHIATRY & NEUROLOGY
Payer: MEDICARE

## 2021-05-13 VITALS — WEIGHT: 184.97 LBS | HEIGHT: 66 IN | BODY MASS INDEX: 29.73 KG/M2

## 2021-05-13 VITALS — DIASTOLIC BLOOD PRESSURE: 65 MMHG | SYSTOLIC BLOOD PRESSURE: 102 MMHG

## 2021-05-13 VITALS — SYSTOLIC BLOOD PRESSURE: 102 MMHG | DIASTOLIC BLOOD PRESSURE: 65 MMHG

## 2021-05-13 DIAGNOSIS — Z79.899: ICD-10-CM

## 2021-05-13 DIAGNOSIS — F17.200: ICD-10-CM

## 2021-05-13 DIAGNOSIS — Z56.0: ICD-10-CM

## 2021-05-13 DIAGNOSIS — R45.851: ICD-10-CM

## 2021-05-13 DIAGNOSIS — Z59.0: ICD-10-CM

## 2021-05-13 DIAGNOSIS — M54.9: ICD-10-CM

## 2021-05-13 DIAGNOSIS — F20.9: ICD-10-CM

## 2021-05-13 DIAGNOSIS — F29: Primary | ICD-10-CM

## 2021-05-13 DIAGNOSIS — F15.10: ICD-10-CM

## 2021-05-13 DIAGNOSIS — Z71.6: ICD-10-CM

## 2021-05-13 DIAGNOSIS — G89.29: ICD-10-CM

## 2021-05-13 PROCEDURE — 80320 DRUG SCREEN QUANTALCOHOLS: CPT

## 2021-05-13 PROCEDURE — 85025 COMPLETE CBC W/AUTO DIFF WBC: CPT

## 2021-05-13 PROCEDURE — 84443 ASSAY THYROID STIM HORMONE: CPT

## 2021-05-13 PROCEDURE — 80061 LIPID PANEL: CPT

## 2021-05-13 PROCEDURE — 87081 CULTURE SCREEN ONLY: CPT

## 2021-05-13 PROCEDURE — 80305 DRUG TEST PRSMV DIR OPT OBS: CPT

## 2021-05-13 PROCEDURE — 83036 HEMOGLOBIN GLYCOSYLATED A1C: CPT

## 2021-05-13 PROCEDURE — 36415 COLL VENOUS BLD VENIPUNCTURE: CPT

## 2021-05-13 PROCEDURE — 80053 COMPREHEN METABOLIC PANEL: CPT

## 2021-05-13 PROCEDURE — 81001 URINALYSIS AUTO W/SCOPE: CPT

## 2021-05-13 PROCEDURE — 87635 SARS-COV-2 COVID-19 AMP PRB: CPT

## 2021-05-13 SDOH — ECONOMIC STABILITY - INCOME SECURITY: UNEMPLOYMENT, UNSPECIFIED: Z56.0

## 2021-05-13 SDOH — ECONOMIC STABILITY - HOUSING INSECURITY: HOMELESSNESS: Z59.0

## 2021-05-13 NOTE — NUR
One to one with the patient with his wife at the bedside. He was initially 
guarded with the information he shared and denied that he was hearing voices 
but his wife stated otherwise. The patient was encouarged to be honest with his 
symptoms so that he could receive appropriate treatment while he was here. He 
then admitted to voices and was anxious and distressed. He reports the voices 
are telling him to hurt himself but he doesn't want to. He stated that he is 
seeing shiney light objects and "the good voices tell me they are a new 
species" He was unsure of his mental health diagnoisis but his wife stated that 
have been talked to about both schizophrenia and bipolar but they are unsure. 
The wife also stated that he suffers from PTSD from childhood trauma. He has 
been getting a month invega IM through a local provider but has not had an 
injection in the past several months 2nd to a lapse in their insurance. He does 
admit to recent meth use as well. Spoke with Hernesto MORAN regarding medications 
and orders received. Spoke with charge RN on [facility] regarding patient and they are 
reviewing his packet for possible admit.

## 2021-05-13 NOTE — NUR
"Wife" Mariam Horington called and checked on , would like a call back 
from mental health [phone]

## 2021-05-13 NOTE — NUR
-------------------------------------------------------------------------------

            *** Note undone in [facility] - 05/13/21 at 1715 by HLACHARLENE ***            

-------------------------------------------------------------------------------

PT SLEEPING ON BACK.

## 2021-05-13 NOTE — NUR
pt was pretending to be asleep when HEATH Parra came to eval, he was reminded 
that his hold would be up soon and that he should cooperate or he would be 
released soon, he "woke up" went to the bathroom, and is now talking to Aida

## 2021-05-13 NOTE — NUR
RELIEVED PRIMARY NURSE FOR A BREAK THE PATIENT WAS AWAKE LAYING ON HIS BACK 
WITH HIS WIFE AT THE BEDSIDE. THEY WERE VISITING NICELY.  THE PATIENT'S 
RESPIRATIONS APPEARED NORMAL AND HE WAS NOT IN ANY DISTRESS AT THIS TIME.

## 2021-05-13 NOTE — NUR
The patient appears to be sleeping. He has been accepted to [facility] and is waiting 
transfer

## 2021-05-14 VITALS — SYSTOLIC BLOOD PRESSURE: 112 MMHG | DIASTOLIC BLOOD PRESSURE: 61 MMHG

## 2021-05-14 VITALS — SYSTOLIC BLOOD PRESSURE: 122 MMHG | DIASTOLIC BLOOD PRESSURE: 79 MMHG

## 2021-05-14 RX ADMIN — NICOTINE SCH PATCH: 21 PATCH, EXTENDED RELEASE TRANSDERMAL at 08:22

## 2021-05-14 NOTE — NUR
Nursing Progress Note:



Legal hold: 5150



Client on involuntary status for GD



Report received from nurse Rabago RN with use of SBAR



Why are they here: Pt brought up to the unit from ER overflow on 5/13/21 at 2215. Pt is a 
35-year-old male with a history of psychosis due to schizophrenia complicated by 
methamphetamine abuse, presents to ER with concern regarding worsening hallucinations 
primarily audio in nature with some visual disturbances, saying that he should harm himself. 
 Patient now having increasing suicidal thoughts.  Patient does not have a specific plan.  
Patient is also not been taking his medications.  States it has probably been 2 to 3 weeks 
since he had his last medications.  Patient reports methamphetamine use 4 days ago. Denies 
any other medical concerns.  Patient states he has a history of suicide attempts, but will 
not specify any details. Pt is currently homeless.







Assessment



What has happened this shift: Pt appeared fatigued this morning. He was encouraged to get up 
for breakfast and he did. Pt put on his sunglasses before coming to breakfast. Asked pt if 
he has light sensitive eyes or gets migraines. Pt replied, "something like that." Pt was 
cooperative with a physical assessment. Attempted a mental health assessment after 
breakfast. Pt was guarded and reluctant to answer questions though did answer some. Pt 
denied depression or SI. Pt denied AH. When asked about VH, pt replied, "all the time." 
Asked pt to describe his visual hallucinations. Pt stated, "they move really fast...they 
think they're colorful but they're not...they're dark." Asked pt if this is the reason why 
he wears the sunglasses. Pt replied, "yes, they keep them out of my soul." 



Pt did not have any psych meds ordered for this morning, only a nicotine patch which he 
accepted. Spoke with charge RN about the med rec. She reported that the provider did not 
wish to restart the Risperdal as he plans on possibly ordering an STRAUSS, Perseris. Pt 
approached this RN before lunch to ask for some medications. Pt had some difficulty 
describing what he felt he needed medication for. Pt stated that he needed something strong, 
"Seroquel works...Thorazine is better." Medicated pt with PRN Ativan 1 mg at 1101. Pt is 
anxiously awaiting speaking with the doctor today.  



S/I, H/I: Pt denies.

A/VH: +VH

Sleep: Pt slept 7.75 hours last night per noc shift report.

ADL's: Independent

Group attendance: No

Were meds taken: No routine psych meds ordered this morning, took PRN Ativan 1 mg and a 
nicotine patch.

Any med S/E: N/A





Mental Status Exam



Appearance: Disheveled younger appearing man with messy, choppy brown hair and black 
iridescent sunglasses.

Eye contact: Poor to fair. 

Behavior: Restless, mostly isolative to self and room.

Speech: Minimal, clear, audible.

Mood: Anxious

Affect: Guarded, anxious.

Thought process: Possibly some thought blocking.

Thought Content: He needs some strong medication, the sunglasses keep the fast moving dark 
beings from getting into his soul.

Cognition: A/O X 4

Insight: Fair

Judgment: Fair







Interventions



PRN's used: Ativan 1 mg



Therapeutic interventions: 1:1 assessment, establishment of rapport, encouraged pt to 
express his thoughts and feelings, therapeutic communication, active listening, ensured 
contract for safety, encouraged pt to come out of room to meals, Q 15 minute safety checks.



Restraints/seclusion/emergency medication: None





Justification of Continued Inpatient Treatment: Pt reports he has been off his meds for over 
3 weeks, he has been having AH/VH and intermittent SI. He has recently used meth and he is 
homeless. Pt needs crisis interruption and stabilization with medication adjustment and 
monitoring in a safe and therapeutic environment until stable.

## 2021-05-14 NOTE — NUR
NURSING ADMISSION NOTE





Pt brought up to the unit from ER overflow on 5/13/21 at 2215. Pt is a 35-year-old male with 
a history of psychosis due to schizophrenia complicated by methamphetamine abuse, presents 
to ER with concern regarding worsening hallucinations primarily audio in nature with some 
visual disturbances, saying that he should harm himself.  Patient now having increasing 
suicidal thoughts.  Patient does not have a specific plan.  Patient is also not been taking 
his medications.  States it has probably been 2 to 3 weeks since he had his last 
medications.  Patient reports methamphetamine use 4 days ago. Denies any other medical 
concerns.  Patient states he has a history of suicide attempts, but will not specify any 
details. Pt is currently homeless.

## 2021-05-14 NOTE — NUR
Nursing Progress Note:



Legal hold: 5150



Client on involuntary status for GD



Report received from nurse Gem HERNANDEZ with use of SBAR



Why are they here: Pt brought up to the unit from ER overflow on 5/13/21 at 2215. Pt is a 
35-year-old male with a history of psychosis due to schizophrenia complicated by 
methamphetamine abuse, presents to ER with concern regarding worsening hallucinations 
primarily audio in nature with some visual disturbances, saying that he should harm himself. 
 Patient now having increasing suicidal thoughts.  Patient does not have a specific plan.  
Patient is also not been taking his medications.  States it has probably been 2 to 3 weeks 
since he had his last medications.  Patient reports methamphetamine use 4 days ago. Denies 
any other medical concerns.  Patient states he has a history of suicide attempts, but will 
not specify any details. Pt is currently homeless.







Assessment



What has happened this shift:

Pt approaches writer at shift change and states, "I need meds now." Writer asks how he is 
feeling and what medications he is referring to. He responds, "I need all the meds I can 
get, all my meds." Pt cannot seem to describe to writer how he feels, but is standing in the 
doorway with sunglasses on fidgeting nervously. Writer offers pt ativan and educated him on 
the medication. PT takes the ativan with good effect. PT is cooperative with 1:1 and states 
he is still hearing AH. 



  



S/I, H/I: Pt denies.

A/VH: +VH

Sleep: see sleep hours 

ADL's: Independent

Group attendance: No

Were meds taken: yes

Any med S/E: N/A





Mental Status Exam



Appearance: Disheveled younger appearing man with messy, choppy brown hair and black 
iridescent sunglasses.

Eye contact: Poor to fair. 

Behavior: Restless, mostly isolative to self and room.

Speech: Minimal, clear, audible.

Mood: Anxious

Affect: Guarded, anxious.

Thought process: Possibly some thought blocking.

Thought Content: He needs some strong medication, the sunglasses keep the fast moving dark 
beings from getting into his soul.

Cognition: A/O X 4

Insight: Fair

Judgment: Fair







Interventions



PRN's used: Ativan 1 mg



Therapeutic interventions: 1:1 assessment, establishment of rapport, encouraged pt to 
express his thoughts and feelings, therapeutic communication, active listening, ensured 
contract for safety, encouraged pt to come out of room to meals, Q 15 minute safety checks.



Restraints/seclusion/emergency medication: None





Justification of Continued Inpatient Treatment: Pt reports he has been off his meds for over 
3 weeks, he has been having AH/VH and intermittent SI. He has recently used meth and he is 
homeless. Pt needs crisis interruption and stabilization with medication adjustment and 
monitoring in a safe and therapeutic environment until stable.

## 2021-05-15 VITALS — SYSTOLIC BLOOD PRESSURE: 106 MMHG | DIASTOLIC BLOOD PRESSURE: 66 MMHG

## 2021-05-15 LAB
CHOLEST SERPL-MCNC: 194 MG/DL (ref 0–200)
CHOLEST/HDLC SERPL: 5.2 {RATIO} (ref 0–4.99)
HBA1C MFR BLD: 5.4 % (ref 4.5–6.2)
HDLC SERPL-MCNC: 37 MG/DL (ref 35–60)
LDLC SERPL DIRECT ASSAY-MCNC: 119 MG/DL (ref 50–100)
TRIGL SERPL-MCNC: 229 MG/DL (ref 20–135)

## 2021-05-15 RX ADMIN — NICOTINE SCH PATCH: 21 PATCH, EXTENDED RELEASE TRANSDERMAL at 08:28

## 2021-05-15 NOTE — NUR
Nursing Progress Note:



Legal hold: 5150



Client on involuntary status for GD



Report received from nurse Rabago RN with use of SBAR



Why are they here: Pt brought up to the unit from ER overflow on 5/13/21 at 2215. Pt is a 
35-year-old male with a history of psychosis due to schizophrenia complicated by 
methamphetamine abuse, presents to ER with concern regarding worsening hallucinations 
primarily audio in nature with some visual disturbances, saying that he should harm himself. 
 Patient now having increasing suicidal thoughts.  Patient does not have a specific plan.  
Patient is also not been taking his medications.  States it has probably been 2 to 3 weeks 
since he had his last medications.  Patient reports methamphetamine use 4 days ago. Denies 
any other medical concerns.  Patient states he has a history of suicide attempts, but will 
not specify any details. Pt is currently homeless.







Assessment



What has happened this shift: 



Pt was up for breakfast then returned immediately back to bed. Pt reported that he felt 
tired this morning. Pt denied depression, SI/HI/AH/VH. Pt appeared anxious and restless 
after lunch. Pt wandered the unit wearing his dark sunglasses with the iridescent lenses. Pt 
approached this RN after lunch to request some Ativan. PRN Ativan 1 mg was given at 1343. 
Again asked pt if he was having any symptoms specifically AH or VH. Pt again denied 
symptoms. Yesterday pt reported that he wore his dark glasses to keep the fast moving dark 
beings he saw from getting into his soul. Asked pt if he wasn't seeing anything unusual why 
he was still wearing his dark sunglasses. Pt replied, "to protect them." This RN assumed he 
meant to protect his eyes. Asked him to protect them from what? Pt answered, "from 
everything." 



 S/I, H/I: Pt denies.

A/VH: Pt denies though continues to wear his dark sunglasses on the unit. 

Sleep: Pt slept 8 hours last night per noc shift report.

ADL's: Independent

Group attendance: No groups today.

Were meds taken: Yes

Any med S/E: Pt reported feeling tired this morning.





Mental Status Exam



Appearance: Younger appearing man dressed in green unit scrubs with disheveled brown hair 
sticking up and black iridescent sunglasses.

Eye contact: Poor to fair though hard to tell as pt usually wears his sunglasses on the 
unit.

Behavior: Restless, mostly isolative to self.

Speech: Minimal, clear, audible.

Mood: Anxious

Affect: Guarded, anxious.

Thought process: May be minimizing symptoms.

Thought Content: He's tired.

Cognition: A/O X 3

Insight: Poor

Judgment: Fair







Interventions



PRN's used: Ativan 1 mg



Therapeutic interventions: 1:1 assessment, establishment of rapport, encouraged pt to 
express his thoughts and feelings, therapeutic communication, active listening, ensured 
contract for safety, encouraged pt to come out of room to meals, Q 15 minute safety checks.



Restraints/seclusion/emergency medication: None





Justification of Continued Inpatient Treatment: Pt reports he has been off his meds for over 
3 weeks, he has been having AH/VH and intermittent SI. He has recently used meth and he is 
homeless. Pt needs crisis interruption and stabilization with medication adjustment and 
monitoring in a safe and therapeutic environment until stable.

## 2021-05-15 NOTE — NUR
Pt approached this nurse to ask for some medicine. Pt stated he was really anxious. 
Determined that he was anxious because, "the voices are really bad." It was too soon to give 
more PRN Ativan so gave Atarax 25 mg.

## 2021-05-16 VITALS — DIASTOLIC BLOOD PRESSURE: 77 MMHG | SYSTOLIC BLOOD PRESSURE: 112 MMHG

## 2021-05-16 RX ADMIN — NICOTINE SCH PATCH: 21 PATCH, EXTENDED RELEASE TRANSDERMAL at 08:12

## 2021-05-16 NOTE — NUR
Nursing Progress Note:



Legal hold: Voluntary 



Client on involuntary status for GD



Report received from nurse Rabago RN with use of SBAR



Why are they here: Pt brought up to the unit from ER overflow on 5/13/21 at 2215. Pt is a 
35-year-old male with a history of psychosis due to schizophrenia complicated by 
methamphetamine abuse, presents to ER with concern regarding worsening hallucinations 
primarily audio in nature with some visual disturbances, saying that he should harm himself. 
 Patient now having increasing suicidal thoughts.  Patient does not have a specific plan.  
Patient is also not been taking his medications.  States it has probably been 2 to 3 weeks 
since he had his last medications.  Patient reports methamphetamine use 4 days ago. Denies 
any other medical concerns.  Patient states he has a history of suicide attempts, but will 
not specify any details. Pt is currently homeless.

Assessment



What has happened this shift: 

Pt. asleep at change of shift, medications administered at bedside, pt. rolled over and put 
his sunglasses on before he took his medications.  Pt. slept till approx. 11am when the unit 
physician assistant did her morning rounds with him.  New order for Invega Sustenna 234mg 
was wrote. Pt. approached nurse at this time requesting injection. Pt. tolerated injection 
well. Pt. encouraged to attend lunch since he missed breakfast. Pt. stated he is funny about 
eating and likes his body to find its own nutrients. Pt. denied a/vh but further into our 
conversation pt. stated he sees and hears things other people dont because he is in another 
dimension. He knows they are real because he sees them, he stated they are not here now 
because they are not allowed to enter some territories, this being one they are not welcome 
in. Pt. states he is here because he had a mental breakdown while driving his car and was 
unable to see so he pulled over an older lady helped him get to the [facility]. Denies 
SI. PRN Ativan administer at 1526. Status changed to voluntary and possible discharge today 
or tomorrow.  



 S/I, H/I: Pt denies.

A/VH: Pt denies but continued to speak of objects he hears and sees that other people dont.

Sleep: Pt slept 7 hours last night, slept till approx. 11am. 

ADL's: Independent

Group attendance: No groups today.

Were meds taken: Yes

Any med S/E: None reported 





Mental Status Exam



Appearance: Younger appearing man dressed in street clothes with disheveled brown hair 
sticking up and black iridescent sunglasses.

Eye contact: Difficult to assess due to pt. wearing sunglasses 

Behavior: isolative to self.

Speech: Normal rate, rhythm and volume

Mood: Cooperative, pleasant 

Affect: Calm

Thought process: Delusional 

Thought Content: Ready to discharge home. 

Cognition: A/O X 3

Insight: Poor

Judgment: Fair



Interventions



PRN's used: Ativan 



Therapeutic interventions: 1:1 assessment, establishment of rapport, encouraged pt to 
express his thoughts and feelings, therapeutic communication, active listening, ensured 
contract for safety, encouraged pt to come out of room to meals, Q 15 minute safety checks.



Restraints/seclusion/emergency medication: None





Justification of Continued Inpatient Treatment: Pt reports he has been off his meds for over 
3 weeks, he has been having AH/VH and intermittent SI. He has recently used meth and he is 
homeless. Pt needs crisis interruption and stabilization with medication adjustment and 
monitoring in a safe and therapeutic environment until stable.

## 2021-05-16 NOTE — NUR
Nursing Progress Note:



Legal hold: 5150



Client on involuntary status for GD



Report received from MARY Felder with use of SBAR



Why they are here: Pt brought up to the unit from ER overflow on 5/13/21 at 2215. Pt is a 
35-year-old male with a history of psychosis due to schizophrenia complicated by 
methamphetamine abuse, presents to ER with concern regarding worsening hallucinations 
primarily audio in nature with some visual disturbances, saying that he should harm himself. 
 Patient now having increasing suicidal thoughts.  Patient does not have a specific plan.  
Patient is also not been taking his medications.  States it has probably been 2 to 3 weeks 
since he had his last medications.  Patient reports methamphetamine use 4 days ago. Denies 
any other medical concerns.  Patient states he has a history of suicide attempts, but will 
not specify any details. Pt is currently homeless.



Assessment



What has happened this shift: 

Pt was pacing the [place] at shift change. He was wearing sunglasses. When asked how he was 
doing, he expressed that he was feeling anxious. He was cooperative and took his medication 
including PRN Ativan and went to bed.

 S/I, H/I: Pt denies.

A/VH: Pt denies though continues to wear his dark sunglasses on the unit. 

Sleep: See sleep report.

ADL's: Independent

Group attendance: No groups today.

Were meds taken: Yes

Any med S/E: none.





Mental Status Exam



Appearance: Younger appearing man dressed in green unit scrubs with disheveled brown hair 
sticking up and black iridescent sunglasses.

Eye contact: Poor to fair though hard to tell as pt usually wears his sunglasses on the 
unit.

Behavior: Restless, mostly isolative to self.

Speech: Minimal, clear, audible.

Mood: Anxious

Affect: Guarded, anxious.

Thought process: May be minimizing symptoms.

Thought Content: He's anxious.

Cognition: A/O X 3

Insight: Poor

Judgment: Fair



Interventions



PRN's used: Ativan 1 mg



Therapeutic interventions: 1:1 assessment, establishment of rapport, encouraged pt to 
express his thoughts and feelings, therapeutic communication, active listening, ensured 
contract for safety, encouraged pt to come out of room to meals, Q 15 minute safety checks.



Restraints/seclusion/emergency medication: None



Justification of Continued Inpatient Treatment: Pt reports he has been off his meds for over 
3 weeks, he has been having AH/VH and intermittent SI. He has recently used meth and he is 
homeless. Pt needs crisis interruption and stabilization with medication adjustment and 
monitoring in a safe and therapeutic environment until stable.

## 2021-05-16 NOTE — NUR
Pt discharged home with wife Mariam at 1725, valuables inventoried and returned. Pt. was 
happy to be discharging, mood was cooperative and polite. Discharge paperwork reviewed, 
understood and signed by pt. Pt. to schedule follow up appointment with Dr. Cramer at [facility].

## 2022-12-10 NOTE — NUR
PATIENT HAS INJURY TO LEFT HAND 3 WEEKS AGO, WORK-RELATED INJURY. PATIENT CUT 
TENDON ON LEFT 5TH FINGER AND WAS DX WITH INFECTION IN THE HAND 10 DAYS LATER. 
PATIENT IS OUT OF ANTIBIOTICS NOW AND THE URGENT CARE PROVIDER SUGGESTED THAT 
HE COME TO THE ER FOR EVALUATION.
13-Dec-2022 09:59